# Patient Record
Sex: MALE | Race: WHITE | Employment: FULL TIME | URBAN - METROPOLITAN AREA
[De-identification: names, ages, dates, MRNs, and addresses within clinical notes are randomized per-mention and may not be internally consistent; named-entity substitution may affect disease eponyms.]

---

## 2019-01-22 ENCOUNTER — OFFICE VISIT (OUTPATIENT)
Dept: FAMILY MEDICINE CLINIC | Age: 49
End: 2019-01-22

## 2019-01-22 ENCOUNTER — HOSPITAL ENCOUNTER (OUTPATIENT)
Dept: LAB | Age: 49
Discharge: HOME | End: 2019-01-22
Payer: MEDICARE

## 2019-01-22 VITALS
HEART RATE: 78 BPM | BODY MASS INDEX: 34.06 KG/M2 | SYSTOLIC BLOOD PRESSURE: 108 MMHG | WEIGHT: 251.5 LBS | OXYGEN SATURATION: 96 % | RESPIRATION RATE: 18 BRPM | DIASTOLIC BLOOD PRESSURE: 89 MMHG | TEMPERATURE: 97.2 F | HEIGHT: 72 IN

## 2019-01-22 DIAGNOSIS — M48.02 STENOSIS OF CERVICAL SPINE WITH MYELOPATHY (HCC): Primary | Chronic | ICD-10-CM

## 2019-01-22 DIAGNOSIS — G99.2 STENOSIS OF CERVICAL SPINE WITH MYELOPATHY (HCC): Primary | Chronic | ICD-10-CM

## 2019-01-22 DIAGNOSIS — K21.9 GASTROESOPHAGEAL REFLUX DISEASE WITHOUT ESOPHAGITIS: ICD-10-CM

## 2019-01-22 DIAGNOSIS — E66.1 CLASS 1 DRUG-INDUCED OBESITY WITH BODY MASS INDEX (BMI) OF 34.0 TO 34.9 IN ADULT, UNSPECIFIED WHETHER SERIOUS COMORBIDITY PRESENT: ICD-10-CM

## 2019-01-22 DIAGNOSIS — R60.0 EDEMA OF RIGHT LOWER EXTREMITY: ICD-10-CM

## 2019-01-22 PROCEDURE — 84443 ASSAY THYROID STIM HORMONE: CPT

## 2019-01-22 PROCEDURE — 83718 ASSAY OF LIPOPROTEIN: CPT

## 2019-01-22 PROCEDURE — 82465 ASSAY BLD/SERUM CHOLESTEROL: CPT

## 2019-01-22 PROCEDURE — 36415 COLL VENOUS BLD VENIPUNCTURE: CPT

## 2019-01-22 PROCEDURE — 85027 COMPLETE CBC AUTOMATED: CPT

## 2019-01-22 PROCEDURE — 80053 COMPREHEN METABOLIC PANEL: CPT

## 2019-01-22 RX ORDER — PREGABALIN 300 MG/1
300 CAPSULE ORAL 2 TIMES DAILY
COMMUNITY

## 2019-01-22 RX ORDER — BACLOFEN 10 MG/1
TABLET ORAL
COMMUNITY
End: 2020-04-10 | Stop reason: SDUPTHER

## 2019-01-22 RX ORDER — PHENTERMINE HYDROCHLORIDE 37.5 MG/1
37.5 TABLET ORAL
Qty: 30 TAB | Refills: 0 | Status: SHIPPED | OUTPATIENT
Start: 2019-01-22 | End: 2019-08-22 | Stop reason: ALTCHOICE

## 2019-01-22 RX ORDER — TAMSULOSIN HYDROCHLORIDE 0.4 MG/1
0.4 CAPSULE ORAL DAILY
COMMUNITY

## 2019-01-22 RX ORDER — DULOXETIN HYDROCHLORIDE 30 MG/1
30 CAPSULE, DELAYED RELEASE ORAL 2 TIMES DAILY
COMMUNITY

## 2019-01-22 RX ORDER — PANTOPRAZOLE SODIUM 40 MG/1
40 TABLET, DELAYED RELEASE ORAL DAILY
Qty: 40 TAB | Refills: 2 | Status: SHIPPED | OUTPATIENT
Start: 2019-01-22

## 2019-01-22 NOTE — PATIENT INSTRUCTIONS
Learning About Benefits From Quitting Smoking How does quitting smoking make you healthier? If you're thinking about quitting smoking, you may have a few reasons to be smoke-free. Your health may be one of them. · When you quit smoking, you lower your risks for cancer, lung disease, heart attack, stroke, blood vessel disease, and blindness from macular degeneration. · When you're smoke-free, you get sick less often, and you heal faster. You are less likely to get colds, flu, bronchitis, and pneumonia. · As a nonsmoker, you may find that your mood is better and you are less stressed. When and how will you feel healthier? Quitting has real health benefits that start from day 1 of being smoke-free. And the longer you stay smoke-free, the healthier you get and the better you feel. The first hours · After just 20 minutes, your blood pressure and heart rate go down. That means there's less stress on your heart and blood vessels. · Within 12 hours, the level of carbon monoxide in your blood drops back to normal. That makes room for more oxygen. With more oxygen in your body, you may notice that you have more energy than when you smoked. After 2 weeks · Your lungs start to work better. · Your risk of heart attack starts to drop. After 1 month · When your lungs are clear, you cough less and breathe deeper, so it's easier to be active. · Your sense of taste and smell return. That means you can enjoy food more than you have since you started smoking. Over the years · After 1 year, your risk of heart disease is half what it would be if you kept smoking. · After 5 years, your risk of stroke starts to shrink. Within a few years after that, it's about the same as if you'd never smoked. · After 10 years, your risk of dying from lung cancer is cut by about half. And your risk for many other types of cancer is lower too. How would quitting help others in your life? When you quit smoking, you improve the health of everyone who now breathes in your smoke. · Their heart, lung, and cancer risks drop, much like yours. · They are sick less. For babies and small children, living smoke-free means they're less likely to have ear infections, pneumonia, and bronchitis. · If you're a woman who is or will be pregnant someday, quitting smoking means a healthier . · Children who are close to you are less likely to become adult smokers. Where can you learn more? Go to http://monica-zachery.info/. Enter [id number] [id number] [id number] in the search box to learn more about \"Learning About Benefits From Quitting Smoking. \" Current as of: 2018 Content Version: 11.9 © 0896-8609 [employer]. Care instructions adapted under license by [employer] (which disclaims liability or warranty for this information). If you have questions about a medical condition or this instruction, always ask your healthcare professional. [place] [address] any warranty or liability for your use of this information. Leg and Ankle Edema: Care Instructions Your Care Instructions Swelling in the legs, ankles, and feet is called edema. It is common after you sit or stand for a while. Long plane flights or car rides often cause swelling in the legs and feet. You may also have swelling if you have to stand for long periods of time at your job. Problems with the veins in the legs (varicose veins) and changes in hormones can also cause swelling. Sometimes the swelling in the ankles and feet is caused by a more serious problem, such as heart failure, infection, blood clots, or liver or kidney disease. Follow-up care is a key part of your treatment and safety. Be sure to make and go to all appointments, and call your doctor if you are having problems. It's also a good idea to know your test results and keep a list of the medicines you take. How can you care for yourself at home? · If your doctor gave you medicine, take it as prescribed. Call your doctor if you think you are having a problem with your medicine. · Whenever you are resting, raise your legs up. Try to keep the swollen area higher than the level of your heart. · Take breaks from standing or sitting in one position. ? Walk around to increase the blood flow in your lower legs. ? Move your feet and ankles often while you stand, or tighten and relax your leg muscles. · Wear support stockings. Put them on in the morning, before swelling gets worse. · Eat a balanced diet. Lose weight if you need to. · Limit the amount of salt (sodium) in your diet. Salt holds fluid in the body and may increase swelling. When should you call for help? Call 911 anytime you think you may need emergency care. For example, call if: 
  · You have symptoms of a blood clot in your lung (called a pulmonary embolism). These may include: 
? Sudden chest pain. ? Trouble breathing. ? Coughing up blood.  
 Call your doctor now or seek immediate medical care if: 
  · You have signs of a blood clot, such as: 
? Pain in your calf, back of the knee, thigh, or groin. ? Redness and swelling in your leg or groin.  
  · You have symptoms of infection, such as: 
? Increased pain, swelling, warmth, or redness. ? Red streaks or pus. ? A fever.  
 Watch closely for changes in your health, and be sure to contact your doctor if: 
  · Your swelling is getting worse.  
  · You have new or worsening pain in your legs.  
  · You do not get better as expected. Where can you learn more? Go to http://monica-zachery.info/. Enter U168 in the search box to learn more about \"Leg and Ankle Edema: Care Instructions. \" Current as of: September 23, 2018 Content Version: 11.9 © 0694-7447 [employer].  Care instructions adapted under license by [employer] (which disclaims liability or warranty for this information). If you have questions about a medical condition or this instruction, always ask your healthcare professional. [place] [address] any warranty or liability for your use of this information.

## 2019-01-22 NOTE — PROGRESS NOTES
Name and  verified Chief Complaint Patient presents with [address] [place] [address] New Patient Patient stated has not had a PCP in years.

## 2019-01-22 NOTE — PROGRESS NOTES
HISTORY OF PRESENT ILLNESS Lidia Krueger is a 50 y.o. male. 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 
 
Obesity Specific concerns today for my pt is the wt concerning, the patient states that no self induced vomiting, and no binge eating,  has been thinking about the use of any laxative use for a better wt, pt also states that different available diets has been tried, Patient present with b/lSwelling of the lower ext, Patient complains of edema. The location of the edema is lower leg, ankle(s)   feet one sided since his surgical treatment,   The edema has been moderate. Onset of symptoms was a few months ago, gradually worsening since that time. The edema is present all day,   the wt went up by close to 10 pounds,  
 
 
Current Outpatient Medications Medication Sig Dispense Refill  DULoxetine (CYMBALTA) 30 mg capsule Take 30 mg by mouth two (2) times a day.  pregabalin (LYRICA) 300 mg capsule Take 300 mg by mouth two (2) times a day.  tamsulosin (FLOMAX) 0.4 mg capsule Take 0.4 mg by mouth daily.  baclofen (LIORESAL) 10 mg tablet Take  by mouth three (3) times daily.  oxyCODONE IR (ROXICODONE) 5 mg immediate release tablet Take 1-2 Tabs by mouth every three (3) hours as needed.  Max Daily Amount: 80 mg. 70 Tab 0  
  ondansetron (ZOFRAN ODT) 8 mg disintegrating tablet Take 1 Tab by mouth every eight (8) hours as needed for Nausea. 30 Tab 0  cyclobenzaprine (FLEXERIL) 5 mg tablet Take 5 mg by mouth. Allergies Allergen Reactions  Penicillin G Other (comments) \"Swelling\" at age 3yo. Past Medical History:  
Diagnosis Date  Arthritis  Chronic pain  GERD (gastroesophageal reflux disease)  Stenosis of cervical spine with myelopathy 3/24/2016 Past Surgical History:  
Procedure Laterality Date  HX ORTHOPAEDIC Spinal Fusion  HX TONSILLECTOMY Family History Problem Relation Age of Onset  Heart defect Mother IRREGULAR HEARTBEAT  
 Other Brother PERIPHERAL ARTERY DISEASE  Anesth Problems Neg Hx Social History Tobacco Use  Smoking status: Current Every Day Smoker Packs/day: 1.00 Years: 20.00 Pack years: 20.00  Smokeless tobacco: Never Used Substance Use Topics  Alcohol use: Yes Comment: MONTHLY Lab Results Component Value Date/Time WBC 9.3 03/16/2016 09:33 AM  
 HGB 15.3 03/16/2016 09:33 AM  
 HCT 43.1 03/16/2016 09:33 AM  
 PLATELET 595 04/57/5517 09:33 AM  
 MCV 89.0 03/16/2016 09:33 AM  
 
Lab Results Component Value Date/Time GFR est non-AA >60 03/16/2016 09:33 AM  
 GFR est AA >60 03/16/2016 09:33 AM  
 Creatinine 0.95 03/16/2016 09:33 AM  
 BUN 12 03/16/2016 09:33 AM  
 Sodium 135 (L) 03/16/2016 09:33 AM  
 Potassium 4.0 03/16/2016 09:33 AM  
 Chloride 103 03/16/2016 09:33 AM  
 CO2 25 03/16/2016 09:33 AM  
  
Review of Systems Constitutional: Negative for chills and fever. HENT: Negative for ear pain and nosebleeds. Eyes: Negative for blurred vision, pain and discharge. Respiratory: Negative for shortness of breath. Cardiovascular: Positive for leg swelling. Negative for chest pain. Gastrointestinal: Negative for constipation, diarrhea, nausea and vomiting. Genitourinary: Negative for frequency. Musculoskeletal: Positive for back pain and joint pain. Skin: Negative for itching and rash. Neurological: Negative for headaches. Psychiatric/Behavioral: Negative for depression. The patient is not nervous/anxious. Physical Exam  
Constitutional: He is oriented to person, place, and time. He appears well-developed and well-nourished. HENT:  
Head: Normocephalic and atraumatic. Mouth/Throat: No oropharyngeal exudate. Eyes: Conjunctivae and EOM are normal.  
Neck: Normal range of motion. Neck supple. Cardiovascular: Normal rate, regular rhythm and normal heart sounds. No murmur heard. Pulmonary/Chest: Effort normal and breath sounds normal. No respiratory distress. Abdominal: Soft. Bowel sounds are normal. He exhibits no distension. There is no rebound. Musculoskeletal: He exhibits edema, tenderness and deformity. Neurological: He is alert and oriented to person, place, and time. Skin: Skin is warm. No erythema. Psychiatric: He has a normal mood and affect. His behavior is normal.  
Nursing note and vitals reviewed. ASSESSMENT and PLAN Diagnoses and all orders for this visit: 1. Stenosis of cervical spine with myelopathy 
-     CBC W/O DIFF 
-     METABOLIC PANEL, COMPREHENSIVE 
-     TSH 3RD GENERATION 
-     HDL CHOLESTEROL 
-     CHOLESTEROL, TOTAL 
-     AMB SUPPLY ORDER 
 
2. Edema of right lower extremity 
-     CBC W/O DIFF 
-     METABOLIC PANEL, COMPREHENSIVE 
-     TSH 3RD GENERATION 
-     HDL CHOLESTEROL 
-     CHOLESTEROL, TOTAL 
-     phentermine (ADIPEX-P) 37.5 mg tablet; Take 1 Tab by mouth every morning. Max Daily Amount: 37.5 mg. 
-     AMB SUPPLY ORDER 
 
3. Class 1 drug-induced obesity with body mass index (BMI) of 34.0 to 34.9 in adult, unspecified whether serious comorbidity present  
-     HDL CHOLESTEROL 
-     phentermine (ADIPEX-P) 37.5 mg tablet; Take 1 Tab by mouth every morning.  Max Daily Amount: 37.5 mg. 
 4. Gastroesophageal reflux disease without esophagitis -     pantoprazole (PROTONIX) 40 mg tablet; Take 1 Tab by mouth daily.

## 2019-01-23 LAB
ALBUMIN SERPL-MCNC: 4.1 G/DL (ref 3.5–5.5)
ALBUMIN/GLOB SERPL: 1.6 {RATIO} (ref 1.2–2.2)
ALP SERPL-CCNC: 90 IU/L (ref 39–117)
ALT SERPL-CCNC: 16 IU/L (ref 0–44)
AST SERPL-CCNC: 14 IU/L (ref 0–40)
BILIRUB SERPL-MCNC: 0.3 MG/DL (ref 0–1.2)
BUN SERPL-MCNC: 9 MG/DL (ref 6–24)
BUN/CREAT SERPL: 9 (ref 9–20)
CALCIUM SERPL-MCNC: 9.3 MG/DL (ref 8.7–10.2)
CHLORIDE SERPL-SCNC: 102 MMOL/L (ref 96–106)
CHOLEST SERPL-MCNC: 179 MG/DL (ref 100–199)
CO2 SERPL-SCNC: 23 MMOL/L (ref 20–29)
CREAT SERPL-MCNC: 0.99 MG/DL (ref 0.76–1.27)
ERYTHROCYTE [DISTWIDTH] IN BLOOD BY AUTOMATED COUNT: 13.2 % (ref 12.3–15.4)
GLOBULIN SER CALC-MCNC: 2.5 G/DL (ref 1.5–4.5)
GLUCOSE SERPL-MCNC: 98 MG/DL (ref 65–99)
HCT VFR BLD AUTO: 42.8 % (ref 37.5–51)
HDLC SERPL-MCNC: 30 MG/DL
HGB BLD-MCNC: 14.6 G/DL (ref 13–17.7)
MCH RBC QN AUTO: 30.8 PG (ref 26.6–33)
MCHC RBC AUTO-ENTMCNC: 34.1 G/DL (ref 31.5–35.7)
MCV RBC AUTO: 90 FL (ref 79–97)
PLATELET # BLD AUTO: 286 X10E3/UL (ref 150–379)
POTASSIUM SERPL-SCNC: 4.3 MMOL/L (ref 3.5–5.2)
PROT SERPL-MCNC: 6.6 G/DL (ref 6–8.5)
RBC # BLD AUTO: 4.74 X10E6/UL (ref 4.14–5.8)
SODIUM SERPL-SCNC: 139 MMOL/L (ref 134–144)
TSH SERPL DL<=0.005 MIU/L-ACNC: 4.56 UIU/ML (ref 0.45–4.5)
WBC # BLD AUTO: 8.4 X10E3/UL (ref 3.4–10.8)

## 2019-02-05 ENCOUNTER — OFFICE VISIT (OUTPATIENT)
Dept: FAMILY MEDICINE CLINIC | Age: 49
End: 2019-02-05

## 2019-02-05 VITALS
HEART RATE: 98 BPM | DIASTOLIC BLOOD PRESSURE: 71 MMHG | RESPIRATION RATE: 18 BRPM | BODY MASS INDEX: 32.37 KG/M2 | OXYGEN SATURATION: 95 % | WEIGHT: 239 LBS | HEIGHT: 72 IN | SYSTOLIC BLOOD PRESSURE: 105 MMHG | TEMPERATURE: 97.1 F

## 2019-02-05 DIAGNOSIS — G35 MULTIPLE SCLEROSIS (HCC): ICD-10-CM

## 2019-02-05 DIAGNOSIS — G95.9 SPINAL CORD LESION (HCC): ICD-10-CM

## 2019-02-05 DIAGNOSIS — Z13.31 SCREENING FOR DEPRESSION: ICD-10-CM

## 2019-02-05 DIAGNOSIS — Z00.00 MEDICARE ANNUAL WELLNESS VISIT, INITIAL: Primary | ICD-10-CM

## 2019-02-05 DIAGNOSIS — Z13.39 SCREENING FOR ALCOHOLISM: ICD-10-CM

## 2019-02-05 DIAGNOSIS — Z23 ENCOUNTER FOR IMMUNIZATION: ICD-10-CM

## 2019-02-05 DIAGNOSIS — M48.02 STENOSIS OF CERVICAL SPINE WITH MYELOPATHY (HCC): Chronic | ICD-10-CM

## 2019-02-05 DIAGNOSIS — G99.2 STENOSIS OF CERVICAL SPINE WITH MYELOPATHY (HCC): Chronic | ICD-10-CM

## 2019-02-05 RX ORDER — ASPIRIN 81 MG/1
81 TABLET ORAL DAILY
Qty: 30 TAB | Refills: 11
Start: 2019-02-05

## 2019-02-05 RX ORDER — PHENTERMINE HYDROCHLORIDE 37.5 MG/1
37.5 TABLET ORAL
Qty: 30 TAB | Refills: 0 | Status: SHIPPED | OUTPATIENT
Start: 2019-04-21 | End: 2019-08-22 | Stop reason: ALTCHOICE

## 2019-02-05 RX ORDER — PHENTERMINE HYDROCHLORIDE 37.5 MG/1
37.5 TABLET ORAL
Qty: 30 TAB | Refills: 0 | Status: SHIPPED | OUTPATIENT
Start: 2019-03-21 | End: 2019-08-22 | Stop reason: ALTCHOICE

## 2019-02-05 RX ORDER — ROSUVASTATIN CALCIUM 10 MG/1
10 TABLET, COATED ORAL
Qty: 30 TAB | Refills: 6 | Status: SHIPPED | OUTPATIENT
Start: 2019-02-05

## 2019-02-05 RX ORDER — PHENTERMINE HYDROCHLORIDE 37.5 MG/1
37.5 TABLET ORAL
Qty: 30 TAB | Refills: 0 | Status: SHIPPED | OUTPATIENT
Start: 2019-02-21 | End: 2019-08-22 | Stop reason: ALTCHOICE

## 2019-02-05 NOTE — PATIENT INSTRUCTIONS
Well Visit, Ages 25 to 48: Care Instructions Your Care Instructions Physical exams can help you stay healthy. Your doctor has checked your overall health and may have suggested ways to take good care of yourself. He or she also may have recommended tests. At home, you can help prevent illness with healthy eating, regular exercise, and other steps. Follow-up care is a key part of your treatment and safety. Be sure to make and go to all appointments, and call your doctor if you are having problems. It's also a good idea to know your test results and keep a list of the medicines you take. How can you care for yourself at home? · Reach and stay at a healthy weight. This will lower your risk for many problems, such as obesity, diabetes, heart disease, and high blood pressure. · Get at least 30 minutes of physical activity on most days of the week. Walking is a good choice. You also may want to do other activities, such as running, swimming, cycling, or playing tennis or team sports. Discuss any changes in your exercise program with your doctor. · Do not smoke or allow others to smoke around you. If you need help quitting, talk to your doctor about stop-smoking programs and medicines. These can increase your chances of quitting for good. · Talk to your doctor about whether you have any risk factors for sexually transmitted infections (STIs). Having one sex partner (who does not have STIs and does not have sex with anyone else) is a good way to avoid these infections. · Use birth control if you do not want to have children at this time. Talk with your doctor about the choices available and what might be best for you. · Protect your skin from too much sun. When you're outdoors from 10 a.m. to 4 p.m., stay in the shade or cover up with clothing and a hat with a wide brim. Wear sunglasses that block UV rays. Even when it's cloudy, put broad-spectrum sunscreen (SPF 30 or higher) on any exposed skin. · See a dentist one or two times a year for checkups and to have your teeth cleaned. · Wear a seat belt in the car. · Drink alcohol in moderation, if at all. That means no more than 2 drinks a day for men and 1 drink a day for women. Follow your doctor's advice about when to have certain tests. These tests can spot problems early. For everyone · Cholesterol. Have the fat (cholesterol) in your blood tested after age 21. Your doctor will tell you how often to have this done based on your age, family history, or other things that can increase your risk for heart disease. · Blood pressure. Have your blood pressure checked during a routine doctor visit. Your doctor will tell you how often to check your blood pressure based on your age, your blood pressure results, and other factors. · Vision. Talk with your doctor about how often to have a glaucoma test. 
· Diabetes. Ask your doctor whether you should have tests for diabetes. · Colon cancer. Have a test for colon cancer at age 48. You may have one of several tests. If you are younger than 48, you may need a test earlier if you have any risk factors. Risk factors include whether you already had a precancerous polyp removed from your colon or whether your parent, brother, sister, or child has had colon cancer. For women · Breast exam and mammogram. Talk to your doctor about when you should have a clinical breast exam and a mammogram. Medical experts differ on whether and how often women under 50 should have these tests. Your doctor can help you decide what is right for you. · Pap test and pelvic exam. Begin Pap tests at age 24. A Pap test is the best way to find cervical cancer. The test often is part of a pelvic exam. Ask how often to have this test. 
· Tests for sexually transmitted infections (STIs). Ask whether you should have tests for STIs. You may be at risk if you have sex with more than one person, especially if your partners do not wear condoms. For men · Tests for sexually transmitted infections (STIs). Ask whether you should have tests for STIs. You may be at risk if you have sex with more than one person, especially if you do not wear a condom. · Testicular cancer exam. Ask your doctor whether you should check your testicles regularly. · Prostate exam. Talk to your doctor about whether you should have a blood test (called a PSA test) for prostate cancer. Experts differ on whether and when men should have this test. Some experts suggest it if you are older than 39 and are -American or have a father or brother who got prostate cancer when he was younger than 72. When should you call for help? Watch closely for changes in your health, and be sure to contact your doctor if you have any problems or symptoms that concern you. Where can you learn more? Go to http://monica-zachery.info/. Enter P072 in the search box to learn more about \"Well Visit, Ages 25 to 48: Care Instructions. \" Current as of: March 28, 2018 Content Version: 11.9 © 6915-9430 [employer]. Care instructions adapted under license by [employer] (which disclaims liability or warranty for this information). If you have questions about a medical condition or this instruction, always ask your healthcare professional. [address] any warranty or liability for your use of this information. Body Mass Index: Care Instructions Your Care Instructions Body mass index (BMI) can help you see if your weight is raising your risk for health problems. It uses a formula to compare how much you weigh with how tall you are. · A BMI lower than 18.5 is considered underweight. · A BMI between 18.5 and 24.9 is considered healthy. · A BMI between 25 and 29.9 is considered overweight. A BMI of 30 or higher is considered obese.  
If your BMI is in the normal range, it means that you have a lower risk for weight-related health problems. If your BMI is in the overweight or obese range, you may be at increased risk for weight-related health problems, such as high blood pressure, heart disease, stroke, arthritis or joint pain, and diabetes. If your BMI is in the underweight range, you may be at increased risk for health problems such as fatigue, lower protection (immunity) against illness, muscle loss, bone loss, hair loss, and hormone problems. BMI is just one measure of your risk for weight-related health problems. You may be at higher risk for health problems if you are not active, you eat an unhealthy diet, or you drink too much alcohol or use tobacco products. Follow-up care is a key part of your treatment and safety. Be sure to make and go to all appointments, and call your doctor if you are having problems. It's also a good idea to know your test results and keep a list of the medicines you take. How can you care for yourself at home? · Practice healthy eating habits. This includes eating plenty of fruits, vegetables, whole grains, lean protein, and low-fat dairy. · If your doctor recommends it, get more exercise. Walking is a good choice. Bit by bit, increase the amount you walk every day. Try for at least 30 minutes on most days of the week. · Do not smoke. Smoking can increase your risk for health problems. If you need help quitting, talk to your doctor about stop-smoking programs and medicines. These can increase your chances of quitting for good. · Limit alcohol to 2 drinks a day for men and 1 drink a day for women. Too much alcohol can cause health problems. If you have a BMI higher than 25 · Your doctor may do other tests to check your risk for weight-related health problems. This may include measuring the distance around your waist. A waist measurement of more than 40 inches in men or 35 inches in women can increase the risk of weight-related health problems. · Talk with your doctor about steps you can take to stay healthy or improve your health. You may need to make lifestyle changes to lose weight and stay healthy, such as changing your diet and getting regular exercise. If you have a BMI lower than 18.5 · Your doctor may do other tests to check your risk for health problems. · Talk with your doctor about steps you can take to stay healthy or improve your health. You may need to make lifestyle changes to gain or maintain weight and stay healthy, such as getting more healthy foods in your diet and doing exercises to build muscle. Where can you learn more? Go to http://monica-zachery.info/. Enter S176 in the search box to learn more about \"Body Mass Index: Care Instructions. \" Current as of: October 13, 2016 Content Version: 11.4 © 2253-4255 [employer]. Care instructions adapted under license by "[employer] (which disclaims liability or warranty for this information). If you have questions about a medical condition or this instruction, always ask your healthcare professional. [place] [address] any warranty or liability for your use of this information. Medicare Wellness Visit, Male The best way to live healthy is to have a lifestyle where you eat a well-balanced diet, exercise regularly, limit alcohol use, and quit all forms of tobacco/nicotine, if applicable. Regular preventive services are another way to keep healthy. Preventive services (vaccines, screening tests, monitoring & exams) can help personalize your care plan, which helps you manage your own care. Screening tests can find health problems at the earliest stages, when they are easiest to treat. [facility] follows the current, evidence-based guidelines published by the [facility] and [place] [employer] (USPSTF) when recommending preventive services for our patients.  Because we follow these guidelines, sometimes recommendations change over time as research supports it. (For example, a prostate screening blood test is no longer routinely recommended for men with no symptoms.) Of course, you and your doctor may decide to screen more often for some diseases, based on your risk and co-morbidities (chronic disease you are already diagnosed with). Preventive services for you include: - Medicare offers their members a free annual wellness visit, which is time for you and your primary care provider to discuss and plan for your preventive service needs. Take advantage of this benefit every year! 
-All adults over age 72 should receive the recommended pneumonia vaccines. Current USPSTF guidelines recommend a series of two vaccines for the best pneumonia protection.  
-All adults should have a flu vaccine yearly and an ECG. All adults age 61 and older should receive a shingles vaccine once in their lifetime.   
-All adults age 38-68 who are overweight should have a diabetes screening test once every three years.  
-Other screening tests & preventive services for persons with diabetes include: an eye exam to screen for diabetic retinopathy, a kidney function test, a foot exam, and stricter control over your cholesterol.  
-Cardiovascular screening for adults with routine risk involves an electrocardiogram (ECG) at intervals determined by the provider.  
-Colorectal cancer screening should be done for adults age 54-65 with no increased risk factors for colorectal cancer. There are a number of acceptable methods of screening for this type of cancer. Each test has its own benefits and drawbacks. Discuss with your provider what is most appropriate for you during your annual wellness visit.  The different tests include: colonoscopy (considered the best screening method), a fecal occult blood test, a fecal DNA test, and sigmoidoscopy. 
-All adults born between [facility] should be screened once for Hepatitis C. 
-An Abdominal Aortic Aneurysm (AAA) Screening is recommended for men age 73-68 who has ever smoked in their lifetime. Here is a list of your current Health Maintenance items (your personalized list of preventive services) with a due date: 
Health Maintenance Due Topic Date Due  Pneumococcal Vaccine (1 of 1 - PPSV23) 10/30/1989  
 DTaP/Tdap/Td  (1 - Tdap) 10/30/1991 [address] [place] [address] Annual Well Visit  12/27/2018 Colon Cancer Screening: Care Instructions Your Care Instructions Colorectal cancer occurs in the colon or rectum. That's the lower part of your digestive system. It is the second-leading cause of cancer deaths in the [place]. It often starts with small growths called polyps in the colon or rectum. Polyps are usually found with screening tests. Depending on the type of test, any polyps found may be removed during the tests. Colorectal cancer usually does not cause symptoms at first. But regular tests can help find it early, before it spreads and becomes harder to treat. Experts advise routine tests for colon cancer for people starting at age 48. And they advise people with a higher risk of colon cancer to get tested sooner. Talk with your doctor about when you should start testing. Discuss which tests you need. Follow-up care is a key part of your treatment and safety. Be sure to make and go to all appointments, and call your doctor if you are having problems. It's also a good idea to know your test results and keep a list of the medicines you take. What are the main screening tests for colon cancer? · Stool tests. These include the fecal immunochemical test (FIT) and the fecal occult blood test (FOBT). These tests check stool samples for signs of cancer. If your test is positive, you will need to have a colonoscopy. · Sigmoidoscopy. This test lets your doctor look at the lining of your rectum and the lowest part of your colon.  Your doctor uses a lighted tube called a sigmoidoscope. This test can't find cancers or polyps in the upper part of your colon. In some cases, polyps that are found can be removed. But if your doctor finds polyps, you will need to have a colonoscopy to check the upper part of your colon. · Colonoscopy. This test lets your doctor look at the lining of your rectum and your entire colon. The doctor uses a thin, flexible tool called a colonoscope. It can also be used to remove polyps or get a tissue sample (biopsy). What tests do you need? The following guidelines are for people age 48 and over who are not at high risk for colorectal cancer. You may have at least one of these tests as directed by your doctor. · Fecal immunochemical test (FIT) or fecal occult blood test (FOBT) every year · Sigmoidoscopy every 5 years · Colonoscopy every 10 years If you are age 68 to 80, you can work with your doctor to decide if screening is a good option. If you are age 80 or older, your doctor will likely advise that screening is not helpful. Talk with your doctor about when you need to be tested. And discuss which tests are right for you. Your doctor may recommend earlier or more frequent testing if you: 
· Have had colorectal cancer before. · Have had colon polyps. · Have symptoms of colorectal cancer. These include blood in your stool and changes in your bowel habits. · Have a parent, brother or sister, or child with colon polyps or colorectal cancer. · Have a bowel disease. This includes ulcerative colitis and Crohn's disease. · Have a rare polyp syndrome that runs in families, such as familial adenomatous polyposis (FAP). · Have had radiation treatments to the belly or pelvis. When should you call for help? Watch closely for changes in your health, and be sure to contact your doctor if: 
  · You have any changes in your bowel habits.  
  · You have any problems. Where can you learn more? Go to http://monica-zachery.info/. Enter M541 in the search box to learn more about \"Colon Cancer Screening: Care Instructions. \" Current as of: March 28, 2018 Content Version: 11.8 © 9422-0046 [employer]. Care instructions adapted under license by [employer] (which disclaims liability or warranty for this information). If you have questions about a medical condition or this instruction, always ask your healthcare professional. [address] any warranty or liability for your use of this information. Preventing Falls: Care Instructions Your Care Instructions Getting around your home safely can be a challenge if you have injuries or health problems that make it easy for you to fall. Loose rugs and furniture in walkways are among the dangers for many older people who have problems walking or who have poor eyesight. People who have conditions such as arthritis, osteoporosis, or dementia also have to be careful not to fall. You can make your home safer with a few simple measures. Follow-up care is a key part of your treatment and safety. Be sure to make and go to all appointments, and call your doctor if you are having problems. It's also a good idea to know your test results and keep a list of the medicines you take. How can you care for yourself at home? Taking care of yourself · You may get dizzy if you do not drink enough water. To prevent dehydration, drink plenty of fluids, enough so that your urine is light yellow or clear like water. Choose water and other caffeine-free clear liquids. If you have kidney, heart, or liver disease and have to limit fluids, talk with your doctor before you increase the amount of fluids you drink. · Exercise regularly to improve your strength, muscle tone, and balance. Walk if you can. Swimming may be a good choice if you cannot walk easily.  
· Have your vision and hearing checked each year or any time you notice a change. If you have trouble seeing and hearing, you might not be able to avoid objects and could lose your balance. · Know the side effects of the medicines you take. Ask your doctor or pharmacist whether the medicines you take can affect your balance. Sleeping pills or sedatives can affect your balance. · Limit the amount of alcohol you drink. Alcohol can impair your balance and other senses. · Ask your doctor whether calluses or corns on your feet need to be removed. If you wear loose-fitting shoes because of calluses or corns, you can lose your balance and fall. · Talk to your doctor if you have numbness in your feet. Preventing falls at home · Remove raised doorway thresholds, throw rugs, and clutter. Repair loose carpet or raised areas in the floor. · Move furniture and electrical cords to keep them out of walking paths. · Use nonskid floor wax, and wipe up spills right away, especially on ceramic tile floors. · If you use a walker or cane, put rubber tips on it. If you use crutches, clean the bottoms of them regularly with an abrasive pad, such as steel wool. · Keep your house well lit, especially Miguel [place], and outside walkways. Use night-lights in areas such as hallways and bathrooms. Add extra light switches or use remote switches (such as switches that go on or off when you clap your hands) to make it easier to turn lights on if you have to get up during the night. · Install sturdy handrails on stairways. · Move items in your cabinets so that the things you use a lot are on the lower shelves (about waist level). · Keep a cordless phone and a flashlight with new batteries by your bed. If possible, put a phone in each of the main rooms of your house, or carry a cell phone in case you fall and cannot reach a phone. Or, you can wear a device around your neck or wrist. You push a button that sends a signal for help. · Wear low-heeled shoes that fit well and give your feet good support. Use footwear with nonskid soles. Check the heels and soles of your shoes for wear. Repair or replace worn heels or soles. · Do not wear socks without shoes on wood floors. · Walk on the grass when the sidewalks are slippery. If you live in an area that gets snow and ice in the winter, sprinkle salt on slippery steps and sidewalks. Preventing falls in the bath · Install grab bars and nonskid mats inside and outside your shower or tub and near the toilet and sinks. · Use shower chairs and bath benches. · Use a hand-held shower head that will allow you to sit while showering. · Get into a tub or shower by putting the weaker leg in first. Get out of a tub or shower with your strong side first. 
· Repair loose toilet seats and consider installing a raised toilet seat to make getting on and off the toilet easier. · Keep your bathroom door unlocked while you are in the shower. Where can you learn more? Go to http://monica-zachery.info/. Enter [id number] in the search box to learn more about \"Preventing Falls: Care Instructions. \" Current as of: March 16, 2018 Content Version: 11.8 © 7961-1413 [employer]. Care instructions adapted under license by [employer] (which disclaims liability or warranty for this information). If you have questions about a medical condition or this instruction, always ask your healthcare professional. [place] [address] any warranty or liability for your use of this information.

## 2019-05-21 ENCOUNTER — OFFICE VISIT (OUTPATIENT)
Dept: FAMILY MEDICINE CLINIC | Age: 49
End: 2019-05-21

## 2019-05-21 VITALS
HEART RATE: 78 BPM | BODY MASS INDEX: 29.23 KG/M2 | HEIGHT: 72 IN | TEMPERATURE: 96.7 F | SYSTOLIC BLOOD PRESSURE: 112 MMHG | WEIGHT: 215.8 LBS | DIASTOLIC BLOOD PRESSURE: 74 MMHG | RESPIRATION RATE: 20 BRPM | OXYGEN SATURATION: 94 %

## 2019-05-21 DIAGNOSIS — J20.8 ACUTE BRONCHITIS DUE TO OTHER SPECIFIED ORGANISMS: Primary | ICD-10-CM

## 2019-05-21 DIAGNOSIS — G99.2 STENOSIS OF CERVICAL SPINE WITH MYELOPATHY (HCC): ICD-10-CM

## 2019-05-21 DIAGNOSIS — L23.7 ALLERGIC DERMATITIS DUE TO POISON IVY: ICD-10-CM

## 2019-05-21 DIAGNOSIS — M48.02 STENOSIS OF CERVICAL SPINE WITH MYELOPATHY (HCC): ICD-10-CM

## 2019-05-21 RX ORDER — AZITHROMYCIN 250 MG/1
TABLET, FILM COATED ORAL
Qty: 6 TAB | Refills: 0 | Status: SHIPPED | OUTPATIENT
Start: 2019-05-21 | End: 2020-01-06 | Stop reason: ALTCHOICE

## 2019-05-21 RX ORDER — BETAMETHASONE DIPROPIONATE 0.5 MG/G
CREAM TOPICAL 2 TIMES DAILY
Qty: 45 G | Refills: 0 | Status: SHIPPED | OUTPATIENT
Start: 2019-05-21 | End: 2019-07-31 | Stop reason: SDUPTHER

## 2019-05-21 RX ORDER — GUAIFENESIN 600 MG/1
600 TABLET, EXTENDED RELEASE ORAL 2 TIMES DAILY
Qty: 15 TAB | Refills: 0
Start: 2019-05-21

## 2019-05-21 RX ORDER — METHYLPREDNISOLONE 4 MG/1
TABLET ORAL
Qty: 1 DOSE PACK | Refills: 0 | Status: SHIPPED | OUTPATIENT
Start: 2019-05-21 | End: 2019-07-31 | Stop reason: ALTCHOICE

## 2019-05-21 NOTE — PATIENT INSTRUCTIONS
Poison [place], and [place]: Care Instructions Your Care Instructions Poison ivy, poison oak, and poison sumac are plants that can cause a skin rash upon contact. The red, itchy rash often shows up in lines or streaks and may cause fluid-filled blisters or large, raised hives. The rash is caused by an allergic reaction to an oil in poison ivy, oak, and sumac. The rash may occur when you touch the plant or when you touch clothing, pet fur, sporting gear, gardening tools, or other objects that have come in contact with one of these plants. You cannot catch or spread the rash, even if you touch it or the blister fluid, because the plant oil will already have been absorbed or washed off the skin. The rash may seem to be spreading, but either it is still developing from earlier contact or you have touched something that still has the plant oil on it. Follow-up care is a key part of your treatment and safety. Be sure to make and go to all appointments, and call your doctor if you are having problems. It's also a good idea to know your test results and keep a list of the medicines you take. How can you care for yourself at home? · If your doctor prescribed a cream, use it as directed. If your doctor prescribed medicine, take it exactly as prescribed. Call your doctor if you think you are having a problem with your medicine. · Use cold, wet cloths to reduce itching. · Keep cool, and stay out of the sun. · Leave the rash open to the air. · Wash all clothing or other things that may have come in contact with the plant oil. · Avoid most lotions and ointments until the rash heals. Calamine lotion may help relieve symptoms of a plant rash. Use it 3 or 4 times a day. To prevent poison ivy exposure If you know that you will be near poison ivy, oak, or sumac, you can try these options: · Use a product designed to help prevent plant oil from getting on the skin. These products, such as Ivy X Pre-Contact Skin Solution, come in lotions, sprays, or towelettes. You put the product on your skin right before you go outdoors. · If you did not use a preventive product and you have had contact with plant oil, clean it off your skin as soon as possible. Use a product such as [employer] Original Outdoor Skin Cleanser. These products can also be used to clean plant oil from clothing or tools. When should you call for help? Call your doctor now or seek immediate medical care if: 
  · Your rash gets worse, and you start to feel bad and have a fever, a stiff neck, nausea, and vomiting.  
  · You have signs of infection, such as: 
? Increased pain, swelling, warmth, or redness. ? Red streaks leading from the rash. ? Pus draining from the rash. ? A fever.  
 Watch closely for changes in your health, and be sure to contact your doctor if: 
  · You have new blisters or bruises, or the rash spreads and looks like a sunburn.  
  · The rash gets worse, or it comes back after nearly disappearing.  
  · You think a medicine you are using is making your rash worse.  
  · Your rash does not clear up after 1 to 2 weeks of home treatment.  
  · You have joint aches or body aches with your rash. Where can you learn more? Go to http://monica-zachery.info/. Enter C210 in the search box to learn more about \"Poison [place], [place], and [place]: Care Instructions. \" Current as of: April 17, 2018 Content Version: 11.9 © 7147-0546 [employer]. Care instructions adapted under license by [employer] ([email or web address]) (which disclaims liability or warranty for this information). If you have questions about a medical condition or this instruction, always ask your healthcare professional. [address] any warranty or liability for your use of this information. Bronchitis: Care Instructions Your Care Instructions Bronchitis is inflammation of the bronchial tubes, which carry air to the lungs. The tubes swell and produce mucus, or phlegm. The mucus and inflamed bronchial tubes make you cough. You may have trouble breathing. Most cases of bronchitis are caused by viruses like those that cause colds. Antibiotics usually do not help and they may be harmful. Bronchitis usually develops rapidly and lasts about 2 to 3 weeks in otherwise healthy people. Follow-up care is a key part of your treatment and safety. Be sure to make and go to all appointments, and call your doctor if you are having problems. It's also a good idea to know your test results and keep a list of the medicines you take. How can you care for yourself at home? · Take all medicines exactly as prescribed. Call your doctor if you think you are having a problem with your medicine. · Get some extra rest. 
· Take an over-the-counter pain medicine, such as acetaminophen (Tylenol), ibuprofen (Advil, Motrin), or naproxen (Aleve) to reduce fever and relieve body aches. Read and follow all instructions on the label. · Do not take two or more pain medicines at the same time unless the doctor told you to. Many pain medicines have acetaminophen, which is Tylenol. Too much acetaminophen (Tylenol) can be harmful. · Take an over-the-counter cough medicine that contains dextromethorphan to help quiet a dry, hacking cough so that you can sleep. Avoid cough medicines that have more than one active ingredient. Read and follow all instructions on the label. · Breathe moist air from a humidifier, hot shower, or sink filled with hot water. The heat and moisture will thin mucus so you can cough it out. · Do not smoke. Smoking can make bronchitis worse. If you need help quitting, talk to your doctor about stop-smoking programs and medicines. These can increase your chances of quitting for good. When should you call for help? Call 911 anytime you think you may need emergency care. For example, call if: 
  · You have severe trouble breathing.  
 Call your doctor now or seek immediate medical care if: 
  · You have new or worse trouble breathing.  
  · You cough up dark brown or bloody mucus (sputum).  
  · You have a new or higher fever.  
  · You have a new rash.  
 Watch closely for changes in your health, and be sure to contact your doctor if: 
  · You cough more deeply or more often, especially if you notice more mucus or a change in the color of your mucus.  
  · You are not getting better as expected. Where can you learn more? Go to http://monica-zachery.info/. Enter H333 in the search box to learn more about \"Bronchitis: Care Instructions. \" Current as of: September 5, 2018 Content Version: 11.9 © 9016-7645 [employer]. Care instructions adapted under license by [employer] (which disclaims liability or warranty for this information). If you have questions about a medical condition or this instruction, always ask your healthcare professional. [address] any warranty or liability for your use of this information.

## 2019-05-21 NOTE — PROGRESS NOTES
HISTORY OF PRESENT ILLNESS  Edi Boucher is a 50 y.o. male. HPI   Rash of the neck, b/l hands arms elbow and upper back and some linear skin lesion on the back  Started few weeks ago not better tried alcohol washing and OTC antibiotic ointments, dosenot tingles and not pain full, states that is notexpanding red, and not  swelled up, with itchiness  Upper respiratory problem    Started >9 days ago not better,   otc not helping, have a very bad Sore throat, with a lot of painful Cough which are Productive yellowish , no hx of asthma but maybe of COPD, there has been a lot of decrease in the patient's sleep pattern, in addition there has been some muscle ache responding to OTC , no diarhea, no ear ache,also there has been a decrease in the appetite, has been had an exposure to sick person, fortunately an XXX smoker    Chronic upper neck with upper ext weaknesses   Currently on no opioid based meds, has been working out and lost some wt for which is bery happy about        Current Outpatient Medications   Medication Sig Dispense Refill    azithromycin (ZITHROMAX) 250 mg tablet 2 first day then one tab daily till finished 6 Tab 0    methylPREDNISolone (MEDROL DOSEPACK) 4 mg tablet As directed for 6 days one package 1 Dose Pack 0    betamethasone dipropionate (DIPROSONE) 0.05 % topical cream Apply  to affected area two (2) times a day. 45 g 0    guaiFENesin ER (MUCINEX) 600 mg ER tablet Take 1 Tab by mouth two (2) times a day. 15 Tab 0    pregabalin (LYRICA) 300 mg capsule Take 300 mg by mouth daily.  rosuvastatin (CRESTOR) 10 mg tablet Take 1 Tab by mouth nightly. 30 Tab 6    phentermine (ADIPEX-P) 37.5 mg tablet Take 1 Tab by mouth every morning. Max Daily Amount: 37.5 mg. 30 Tab 0    aspirin delayed-release 81 mg tablet Take 1 Tab by mouth daily. 30 Tab 11    phentermine (ADIPEX-P) 37.5 mg tablet Take 1 Tab by mouth every morning.  Max Daily Amount: 37.5 mg. 30 Tab 0    phentermine (ADIPEX-P) 37.5 mg tablet Take 1 Tab by mouth every morning. Max Daily Amount: 37.5 mg. 30 Tab 0    DULoxetine (CYMBALTA) 30 mg capsule Take 30 mg by mouth two (2) times a day.  tamsulosin (FLOMAX) 0.4 mg capsule Take 0.4 mg by mouth daily.  baclofen (LIORESAL) 10 mg tablet Take  by mouth three (3) times daily as needed.  phentermine (ADIPEX-P) 37.5 mg tablet Take 1 Tab by mouth every morning. Max Daily Amount: 37.5 mg. 30 Tab 0    pantoprazole (PROTONIX) 40 mg tablet Take 1 Tab by mouth daily. 40 Tab 2    oxyCODONE IR (ROXICODONE) 5 mg immediate release tablet Take 1-2 Tabs by mouth every three (3) hours as needed. Max Daily Amount: 80 mg. 70 Tab 0    ondansetron (ZOFRAN ODT) 8 mg disintegrating tablet Take 1 Tab by mouth every eight (8) hours as needed for Nausea. 30 Tab 0    cyclobenzaprine (FLEXERIL) 5 mg tablet Take 5 mg by mouth two (2) times daily as needed. Allergies   Allergen Reactions    Penicillin G Other (comments)     \"Swelling\" at age 3yo.        Past Medical History:   Diagnosis Date    Arthritis     Chronic pain     GERD (gastroesophageal reflux disease)     Stenosis of cervical spine with myelopathy ([facility] [address] 3/24/2016     Past Surgical History:   Procedure Laterality Date    HX ORTHOPAEDIC      Spinal Fusion    HX TONSILLECTOMY       Family History   Problem Relation Age of Onset    Heart defect Mother         IRREGULAR HEARTBEAT    Other Brother         PERIPHERAL ARTERY DISEASE    Anesth Problems Neg Hx      Social History     Tobacco Use    Smoking status: Current Every Day Smoker     Packs/day: 1.00     Years: 20.00     Pack years: 20.00    Smokeless tobacco: Never Used   Substance Use Topics    Alcohol use: Yes     Comment: MONTHLY      Lab Results   Component Value Date/Time    WBC 8.4 01/22/2019 03:09 PM    HGB 14.6 01/22/2019 03:09 PM    HCT 42.8 01/22/2019 03:09 PM    PLATELET 736 81/73/6374 03:09 PM    MCV 90 01/22/2019 03:09 PM     Lab Results   Component Value Date/Time TSH 4.560 (H) 01/22/2019 03:09 PM         Review of Systems   Constitutional: Negative for chills and fever. HENT: Negative for ear pain and nosebleeds. Eyes: Negative for blurred vision, pain and discharge. Respiratory: Negative for shortness of breath. Cardiovascular: Negative for chest pain and leg swelling. Gastrointestinal: Negative for constipation, diarrhea, nausea and vomiting. Genitourinary: Negative for frequency. Musculoskeletal: Negative for joint pain. Skin: Negative for itching and rash. Neurological: Negative for headaches. Psychiatric/Behavioral: Negative for depression. The patient is not nervous/anxious. Physical Exam   Constitutional: He is oriented to person, place, and time. He appears well-developed and well-nourished. HENT:   Head: Normocephalic and atraumatic. Mouth/Throat: No oropharyngeal exudate. Eyes: Conjunctivae and EOM are normal.   Neck: Normal range of motion. Neck supple. Cardiovascular: Normal rate, regular rhythm and normal heart sounds. No murmur heard. Pulmonary/Chest: Effort normal and breath sounds normal. No respiratory distress. Abdominal: Soft. Bowel sounds are normal. He exhibits no distension. There is no rebound. Musculoskeletal: He exhibits no edema or tenderness. Neurological: He is alert and oriented to person, place, and time. Skin: Skin is warm. No erythema. Psychiatric: He has a normal mood and affect. His behavior is normal.   Nursing note and vitals reviewed. ASSESSMENT and PLAN  Diagnoses and all orders for this visit:    1. Acute bronchitis due to other specified organisms  -     azithromycin (ZITHROMAX) 250 mg tablet; 2 first day then one tab daily till finished  -     guaiFENesin ER (MUCINEX) 600 mg ER tablet; Take 1 Tab by mouth two (2) times a day. 2. Allergic dermatitis due to poison ivy  -     methylPREDNISolone (MEDROL DOSEPACK) 4 mg tablet;  As directed for 6 days one package  - betamethasone dipropionate (DIPROSONE) 0.05 % topical cream; Apply  to affected area two (2) times a day. 3. Stenosis of cervical spine with myelopathy (HCC)  -     methylPREDNISolone (MEDROL DOSEPACK) 4 mg tablet; As directed for 6 days one package      She was told to have a low-fat low-cholesterol diet, include seafood such as adding fatty fish such as [place], [place], [place] to the diet, increase vegetables and fruits, nuts 3-4 times per week and finally have a low-salt and K rich food intake for a good 4-6 months possibly for ever for the best outcome, patient was told that either a DASH diet or Mediterranean diet but satisfies the need     Routine labs ordered, and the needed abnormal labs will be discussed soon and they can be repeated in 3-6 months. In addition relevant handouts were given to the patient for a better understanding,    patient was told to call if any problems. Patient acknowledged understanding and  agreed with today's recommendations.

## 2019-05-21 NOTE — PROGRESS NOTES
Name and  verified      Chief Complaint   Patient presents with    Rash     Groin/arms/back for one week patient reported. Health Maintenance reviewed-discussed with patient. 1. Have you been to the ER, urgent care clinic since your last visit? Hospitalized since your last visit? no    2. Have you seen or consulted any other health care providers outside of the [address] [place] [address] since your last visit? Include any pap smears or colon screening.  no

## 2019-07-31 ENCOUNTER — OFFICE VISIT (OUTPATIENT)
Dept: FAMILY MEDICINE CLINIC | Age: 49
End: 2019-07-31

## 2019-07-31 VITALS
RESPIRATION RATE: 20 BRPM | TEMPERATURE: 96 F | BODY MASS INDEX: 28.19 KG/M2 | HEIGHT: 72 IN | WEIGHT: 208.1 LBS | SYSTOLIC BLOOD PRESSURE: 130 MMHG | OXYGEN SATURATION: 100 % | HEART RATE: 72 BPM | DIASTOLIC BLOOD PRESSURE: 80 MMHG

## 2019-07-31 DIAGNOSIS — L23.7 ALLERGIC DERMATITIS DUE TO POISON IVY: ICD-10-CM

## 2019-07-31 DIAGNOSIS — B35.1 ONYCHOMYCOSIS OF LEFT GREAT TOE: Primary | ICD-10-CM

## 2019-07-31 DIAGNOSIS — J45.20 MILD INTERMITTENT ASTHMA WITHOUT COMPLICATION: ICD-10-CM

## 2019-07-31 RX ORDER — METHYLPREDNISOLONE 4 MG/1
TABLET ORAL
Qty: 1 DOSE PACK | Refills: 0 | Status: SHIPPED | OUTPATIENT
Start: 2019-07-31 | End: 2019-08-06 | Stop reason: SDUPTHER

## 2019-07-31 RX ORDER — ALBUTEROL SULFATE 90 UG/1
1 AEROSOL, METERED RESPIRATORY (INHALATION)
Qty: 1 INHALER | Refills: 1 | Status: SHIPPED | OUTPATIENT
Start: 2019-07-31 | End: 2019-12-09 | Stop reason: SDUPTHER

## 2019-07-31 RX ORDER — DEXAMETHASONE SODIUM PHOSPHATE 10 MG/ML
10 INJECTION INTRAMUSCULAR; INTRAVENOUS ONCE
Qty: 1 ML | Refills: 0
Start: 2019-07-31 | End: 2019-07-31

## 2019-07-31 RX ORDER — NYSTATIN 100000 U/G
CREAM TOPICAL 2 TIMES DAILY
Qty: 30 G | Refills: 2 | Status: SHIPPED | OUTPATIENT
Start: 2019-07-31

## 2019-07-31 RX ORDER — BETAMETHASONE DIPROPIONATE 0.5 MG/G
CREAM TOPICAL 2 TIMES DAILY
Qty: 45 G | Refills: 0
Start: 2019-07-31 | End: 2020-01-06 | Stop reason: ALTCHOICE

## 2019-07-31 NOTE — PROGRESS NOTES
HISTORY OF PRESENT ILLNESS  Epi Guillory is a 50 y.o. male. HPI   Rash of the neck, hands wrists chest arms, patient states that it all started few days ago after working in the backyard stating that the patient tried to clean some weeds and degrees around the bushes and around the backyard plants problem not better tried alcohol washing and OTC antibiotic ointments, does tingles and not pain full, states that is expanding red, and swelled up, patient states that mostly they are small fluid-filled vesicles multiple rounds, with a lot of itchiness, regardless stating that condition is not getting better, patient also states that the patient is up-to-date with all childhood vaccination and no family member having the same condition  Rash of the left great toenail started few weeks ago not better tried alcohol washing and OTC antibiotic ointments, dosenot tingles and not pain full, states that is notexpanding red, and not  swelled up, whitish patches rounds, with itchiness    Asthma  The patient has had no frequent daytime and nighttime asthma symptoms, patient currently has not been taking the short-acting bronchodilator over last couple weeks,  The patient is using short-acting beta agonists for symptom control , fortunately patient has no exacerbation over the last 12 months,  stating that last oral systemic corticosteroids was couple yrs ago,  patient currently has no wheezing, no dry cough cough, no sob,  Also stating that there has not been few night awakening for dyspnea over the last 30 days, compliant with meds, + RF needed,     Current Outpatient Medications   Medication Sig Dispense Refill    betamethasone dipropionate (DIPROSONE) 0.05 % topical cream Apply  to affected area two (2) times a day. 45 g 0    nystatin (MYCOSTATIN) topical cream Apply  to affected area two (2) times a day.  30 g 2    albuterol (PROVENTIL HFA, VENTOLIN HFA, PROAIR HFA) 90 mcg/actuation inhaler Take 1 Puff by inhalation every four (4) hours as needed for Wheezing. 1 Inhaler 1    methylPREDNISolone (MEDROL DOSEPACK) 4 mg tablet As directed for 6 days one package 1 Dose Pack 0    pregabalin (LYRICA) 300 mg capsule Take 300 mg by mouth daily.  azithromycin (ZITHROMAX) 250 mg tablet 2 first day then one tab daily till finished 6 Tab 0    guaiFENesin ER (MUCINEX) 600 mg ER tablet Take 1 Tab by mouth two (2) times a day. 15 Tab 0    rosuvastatin (CRESTOR) 10 mg tablet Take 1 Tab by mouth nightly. 30 Tab 6    phentermine (ADIPEX-P) 37.5 mg tablet Take 1 Tab by mouth every morning. Max Daily Amount: 37.5 mg. 30 Tab 0    aspirin delayed-release 81 mg tablet Take 1 Tab by mouth daily. 30 Tab 11    phentermine (ADIPEX-P) 37.5 mg tablet Take 1 Tab by mouth every morning. Max Daily Amount: 37.5 mg. 30 Tab 0    phentermine (ADIPEX-P) 37.5 mg tablet Take 1 Tab by mouth every morning. Max Daily Amount: 37.5 mg. 30 Tab 0    DULoxetine (CYMBALTA) 30 mg capsule Take 30 mg by mouth two (2) times a day.  tamsulosin (FLOMAX) 0.4 mg capsule Take 0.4 mg by mouth daily.  baclofen (LIORESAL) 10 mg tablet Take  by mouth three (3) times daily as needed.  phentermine (ADIPEX-P) 37.5 mg tablet Take 1 Tab by mouth every morning. Max Daily Amount: 37.5 mg. 30 Tab 0    pantoprazole (PROTONIX) 40 mg tablet Take 1 Tab by mouth daily. 40 Tab 2    ondansetron (ZOFRAN ODT) 8 mg disintegrating tablet Take 1 Tab by mouth every eight (8) hours as needed for Nausea. 30 Tab 0    cyclobenzaprine (FLEXERIL) 5 mg tablet Take 5 mg by mouth two (2) times daily as needed. Allergies   Allergen Reactions    Penicillin G Other (comments)     \"Swelling\" at age 5yo.        Past Medical History:   Diagnosis Date    Arthritis     Chronic pain     GERD (gastroesophageal reflux disease)     Stenosis of cervical spine with myelopathy ([facility] [address] 3/24/2016     Past Surgical History:   Procedure Laterality Date    HX ORTHOPAEDIC      Spinal Fusion    HX TONSILLECTOMY       Family History   Problem Relation Age of Onset    Heart defect Mother         IRREGULAR HEARTBEAT    Other Brother         PERIPHERAL ARTERY DISEASE    Anesth Problems Neg Hx      Social History     Tobacco Use    Smoking status: Current Every Day Smoker     Packs/day: 1.00     Years: 20.00     Pack years: 20.00    Smokeless tobacco: Never Used   Substance Use Topics    Alcohol use: Yes     Comment: MONTHLY      Lab Results   Component Value Date/Time    Hemoglobin A1c 5.0 03/16/2016 09:33 AM    Glucose 98 01/22/2019 03:09 PM    Creatinine 0.99 01/22/2019 03:09 PM      Lab Results   Component Value Date/Time    Cholesterol, total 179 01/22/2019 03:09 PM    HDL Cholesterol 30 (L) 01/22/2019 03:09 PM        Review of Systems   Constitutional: Negative for chills and fever. HENT: Negative for ear pain and nosebleeds. Eyes: Negative for blurred vision, pain and discharge. Respiratory: Negative for shortness of breath. Cardiovascular: Negative for chest pain and leg swelling. Gastrointestinal: Negative for constipation, diarrhea, nausea and vomiting. Genitourinary: Negative for frequency. Musculoskeletal: Negative for joint pain. Skin: Positive for itching and rash. Neurological: Negative for headaches. Psychiatric/Behavioral: Negative for depression. The patient is not nervous/anxious. Physical Exam   Constitutional: He is oriented to person, place, and time. He appears well-developed and well-nourished. HENT:   Head: Normocephalic and atraumatic. Mouth/Throat: No oropharyngeal exudate. Eyes: Conjunctivae and EOM are normal.   Neck: Normal range of motion. Neck supple. Cardiovascular: Normal rate, regular rhythm and normal heart sounds. No murmur heard. Pulmonary/Chest: Effort normal and breath sounds normal. No respiratory distress. Abdominal: Soft. Bowel sounds are normal. He exhibits no distension. There is no rebound.    Musculoskeletal: He exhibits no edema or tenderness. Neurological: He is alert and oriented to person, place, and time. Skin: Skin is warm. Rash noted. There is erythema. Psychiatric: He has a normal mood and affect. His behavior is normal.   Nursing note and vitals reviewed. ASSESSMENT and PLAN  Diagnoses and all orders for this visit:    1. Onychomycosis of left great toe  -     betamethasone dipropionate (DIPROSONE) 0.05 % topical cream; Apply  to affected area two (2) times a day. -     nystatin (MYCOSTATIN) topical cream; Apply  to affected area two (2) times a day. -     methylPREDNISolone (MEDROL DOSEPACK) 4 mg tablet; As directed for 6 days one package    2. Allergic dermatitis due to poison ivy  -     betamethasone dipropionate (DIPROSONE) 0.05 % topical cream; Apply  to affected area two (2) times a day. -     nystatin (MYCOSTATIN) topical cream; Apply  to affected area two (2) times a day. -     methylPREDNISolone (MEDROL DOSEPACK) 4 mg tablet; As directed for 6 days one package    3. Mild intermittent asthma without complication  -     betamethasone dipropionate (DIPROSONE) 0.05 % topical cream; Apply  to affected area two (2) times a day. -     nystatin (MYCOSTATIN) topical cream; Apply  to affected area two (2) times a day. -     albuterol (PROVENTIL HFA, VENTOLIN HFA, PROAIR HFA) 90 mcg/actuation inhaler; Take 1 Puff by inhalation every four (4) hours as needed for Wheezing.  -     methylPREDNISolone (MEDROL DOSEPACK) 4 mg tablet;  As directed for 6 days one package

## 2019-07-31 NOTE — PATIENT INSTRUCTIONS
Poison VALERIEZARAMARINA, [place], and Elsy Joedon Toenail Fungus: Care Instructions Your Care Instructions A toenail that is infected by a fungus usually turns white or yellow. As the fungus spreads, the nail turns a darker color and gets thicker, and its edges start to turn ragged and crumble. A bad infection can cause toe pain, and the nail may pull away from the toe. Toenails that are exposed to moisture and warmth a lot are more likely to get infected by a fungus. This can happen from wearing sweaty shoes often and from walking barefoot on shower floors. It is hard to treat toenail fungus, and the infection can return after it has cleared up. But medicines can sometimes get rid of toenail fungus for good. If the infection is very bad, or if it causes a lot of pain, you may need to have the nail removed. Follow-up care is a key part of your treatment and safety. Be sure to make and go to all appointments, and call your doctor if you are having problems. It's also a good idea to know your test results and keep a list of the medicines you take. How can you care for yourself at home? · Take your medicines exactly as prescribed. Call your doctor if you have any problems with your medicine. You will get more details on the specific medicines your doctor prescribes. · If your doctor gave you a cream or liquid to put on your toenail, use it exactly as directed. · Wash your feet often, and wash your hands after touching your feet. · Keep your toenails clean and dry. Dry your feet completely after you bathe and before you put on shoes and socks. · Keep your toenails trimmed. · Change socks often. Wear dry socks that absorb moisture. · Do not go barefoot in public places. · Use a spray or powder that fights fungus on your feet and in your shoes. · Do not pick at the skin around your nails. · Do not use nail polish or fake nails on your toenails. When should you call for help? Call your doctor now or seek immediate medical care if: 
  · You have signs of infection, such as: 
? Increased pain, swelling, warmth, or redness. ? Red streaks leading from the site. ? Pus draining from the site. ? A fever.  
  · You have new or increased toe pain.  
 Watch closely for changes in your health, and be sure to contact your doctor if: 
  · You do not get better as expected. Where can you learn more? Go to http://monica-zachery.info/. Enter D202 in the search box to learn more about \"Toenail Fungus: Care Instructions. \" Current as of: April 1, 2019 Content Version: 12.1 © 9218-9472 "[employer]". Care instructions adapted under license by [employer] (which disclaims liability or warranty for this information). If you have questions about a medical condition or this instruction, always ask your healthcare professional. [place] [address] any warranty or liability for your use of this information. umac: Care Instructions Your Care Instructions Poison ivy, poison oak, and poison sumac are plants that can cause a skin rash upon contact. The red, itchy rash often shows up in lines or streaks and may cause fluid-filled blisters or large, raised hives. The rash is caused by an allergic reaction to an oil in poison ivy, oak, and sumac. The rash may occur when you touch the plant or when you touch clothing, pet fur, sporting gear, gardening tools, or other objects that have come in contact with one of these plants. You cannot catch or spread the rash, even if you touch it or the blister fluid, because the plant oil will already have been absorbed or washed off the skin. The rash may seem to be spreading, but either it is still developing from earlier contact or you have touched something that still has the plant oil on it. Follow-up care is a key part of your treatment and safety.  Be sure to make and go to all appointments, and call your doctor if you are having problems. It's also a good idea to know your test results and keep a list of the medicines you take. How can you care for yourself at home? · If your doctor prescribed a cream, use it as directed. If your doctor prescribed medicine, take it exactly as prescribed. Call your doctor if you think you are having a problem with your medicine. · Use cold, wet cloths to reduce itching. · Keep cool, and stay out of the sun. · Leave the rash open to the air. · Wash all clothing or other things that may have come in contact with the plant oil. · Avoid most lotions and ointments until the rash heals. Calamine lotion may help relieve symptoms of a plant rash. Use it 3 or 4 times a day. To prevent poison ivy exposure If you know that you will be near poison ivy, oak, or sumac, you can try these options: · Use a product designed to help prevent plant oil from getting on the skin. These products, such as Ivy X Pre-Contact Skin Solution, come in lotions, sprays, or towelettes. You put the product on your skin right before you go outdoors. · If you did not use a preventive product and you have had contact with plant oil, clean it off your skin as soon as possible. Use a product such as Tecnu Original Outdoor Skin Cleanser. These products can also be used to clean plant oil from clothing or tools. When should you call for help? Call your doctor now or seek immediate medical care if: 
  · Your rash gets worse, and you start to feel bad and have a fever, a stiff neck, nausea, and vomiting.  
  · You have signs of infection, such as: 
? Increased pain, swelling, warmth, or redness. ? Red streaks leading from the rash. ? Pus draining from the rash. ? A fever.  
 Watch closely for changes in your health, and be sure to contact your doctor if: 
  · You have new blisters or bruises, or the rash spreads and looks like a sunburn.   · The rash gets worse, or it comes back after nearly disappearing.  
  · You think a medicine you are using is making your rash worse.  
  · Your rash does not clear up after 1 to 2 weeks of home treatment.  
  · You have joint aches or body aches with your rash. Where can you learn more? Go to http://monica-zachery.info/. Enter A705 in the search box to learn more about \"Poison [place], [place], and [place]ac: Care Instructions. \" Current as of: April 1, 2019 Content Version: 12.1 © 5512-0504 [employer]. Care instructions adapted under license by [employer] (which disclaims liability or warranty for this information). If you have questions about a medical condition or this instruction, always ask your healthcare professional. [address] any warranty or liability for your use of this information. Asthma: Your Action Plan Sample Action Plan Controller medicine action plan Fill in the blank spaces and boxes that apply for all sections. · Name of your controller medicine: 
? ____________________________________________ · How much of this medicine do you take? ? ____________________________________________ · How often do you take this medicine? ? ____________________________________________ · Other instructions? ? ____________________________________________ Quick-relief medicine action plan · Name of your quick-relief medicine: 
? ____________________________________________ · How much of this medicine do you take? ? ____________________________________________ · How often do you take this medicine? ? ____________________________________________ Asthma Zones GREEN ZONE: This is where you want to be! Green zone symptoms · You have no shortness of breath or chest tightness. You are not coughing or wheezing. · You can do all of your usual activities. · You sleep well at night. Green zone peak flow (if you use a peak flow meter) · ______ or more (80% or more of your personal best) Green zone actions (Check the boxes and fill in the blank spaces that apply.) [ ] You take your controller medicine(s) every day. [ ] Chandra Garrett are staying away from your asthma triggers. [ ] You take quick-relief medicine (called _____________________) ______ minutes before exercise. YELLOW ZONE: Your asthma is getting worse. Yellow zone symptoms · You are short of breath or have chest tightness. You are coughing or wheezing. · You have symptoms that keep you up at night. · You can do some, but not all, of your usual activities. Yellow zone peak flow (if you use a peak flow meter) · ______ to ______ (50% to 79% of your personal best) Yellow zone actions (Check the boxes and fill in the blank spaces that apply.) [ ] Take _____ puff(s) of quick-relief medicine called ______________________. Repeat _____ times. [ ] If your symptoms don't get better or your peak flow has not returned to the green zone in 1 hour, then: · [ ] Take _____ puff(s) of medicine called ______________________. Take it ____ times a day. · [ ] Begin or increase treatment with corticosteroid pills. Take ______ mg of medicine called ____________________________ every __________. · [ ] Call your doctor at this number: ____________________. RED ZONE: Danger! Red zone symptoms · You are very short of breath. · You can't do your usual activities. · Quick-relief medicine doesn't help. Or your symptoms don't get better after 24 hours in the yellow zone. Red zone peak flow (if you use a peak flow meter) · Less than _______ (less than 50% of your personal best) Red zone actions (Check the boxes and fill in the blank spaces that apply.) [ ] Take _____ puff(s) of quick-relief medicine called ____________________________. Repeat ______ times. [ ] Begin or increase treatment with corticosteroid pills. Take ________ mg now. [ ] Call your doctor at this number: _________________. If you can't contact your doctor, go to the emergency department. Call 911 or ___________________. [ ] Other numbers you might call are: ___________________________________. When should you call for help? Call 911 anytime you think you may need emergency care. For example, call if: 
· You have severe trouble breathing. Call your doctor now or seek immediate medical care if: 
· You are in the red zone of your asthma action plan. · You've used your quick-relief medicine but are still having trouble breathing. · You cough up blood. · You have new or worse trouble breathing. · You cough up dark brown or bloody mucus (sputum). Watch closely for changes in your health, and be sure to contact your doctor if: 
· You need to use quick-relief medicine more than 2 days each week (unless it's just for exercise). · Your coughing and wheezing get worse. Follow-up care is a key part of your treatment and safety. Be sure to make and go to all appointments, and call your doctor if you are having problems. It's also a good idea to know your test results and keep a list of the medicines you take. Where can you learn more? Go to http://monica-zachery.info/. Enter 97 56 45 in the search box to learn more about \"Asthma: Your Action Plan. \" Current as of: September 5, 2018 Content Version: 12.1 © 9622-6552 [employer]. Care instructions adapted under license by [employer] (which disclaims liability or warranty for this information). If you have questions about a medical condition or this instruction, always ask your healthcare professional. [address] any warranty or liability for your use of this information. How to Give an Intramuscular Shot: Care Instructions Overview An intramuscular shot is an injection of medicine into a muscle.  Some medicinessuch as the hormone testosterone or fertility medicinesneed to be injected into a muscle to work. This type of shot is usually given in the thigh or hip. If it's easier, you may want to have someone else give you the shot in your hip. At first, you may be nervous about giving yourself a shot. But soon, giving the shot will become routine. Follow-up care is a key part of your treatment and safety. Be sure to make and go to all appointments, and call your doctor if you are having problems. It's also a good idea to know your test results and keep a list of the medicines you take. How can you give yourself an intramuscular shot? Follow your health professional's instructions for where and how often to inject your medicine. Your nurse will show you how to give yourself the shot. 1. Gather your equipment. This includes your syringe (containing medicine) and an alcohol wipe or a cotton ball dipped in alcohol. 2. Wash your hands with soap and running water. Dry them well. 3. Choose a spot on your thigh or behind your hip for the shot. Check to make sure that the muscle isn't already sore from activity. If it is sore, you could have pain in the area after giving the shot. If the muscle is sore, pick a new area for the shot. 4. Use alcohol to clean the skin. Let it dry. 5. Remove the cap from the needle. 6. Hold the syringe like a dart close to the site. Keep your fingers off the plunger. 7. Stretch the skin flat using your fingers and thumb of one hand. 8. Place the syringe at a 90-degree angle to the shot site. The needle should stand straight up from the skin. 9. Quickly jab the needle all the way into the skin. 10. Hold the syringe in place with one hand, and pull back on the plunger with the other hand. Pull the plunger slowly. If you see blood in the syringe, you have hit a blood vessel. If you hit a vessel, pull the needle out of the skin.  Get rid of the needle and syringe, and prepare a new syringe with medicine. Insert the new needle in a different spot, and check again to see if there is blood. 11. If there's no blood in the syringe, slowly push the plunger all the way in. This way, the medicine goes into the muscle. 12. Take the needle out at the same angle that you inserted it. 13. If you bleed a little, apply pressure over the shot area. You can use your finger, a cotton ball, or a piece of gauze. To help avoid bruising, don't rub the area. 14. Dispose of the needle safely. Don't use the same needle more than one time. Slightly change the spot where you give the shot each time you do it. When should you call for help? Watch closely for changes in your health, and be sure to contact your doctor if you have any problems. Where can you learn more? Go to http://monica-zachery.info/. Enter V637 in the search box to learn more about \"How to Give an Intramuscular Shot: Care Instructions. \" Current as of: July 25, 2018 Content Version: 12.1 © 7690-8860 [email or web address]. Care instructions adapted under license by [employer] (which disclaims liability or warranty for this information). If you have questions about a medical condition or this instruction, always ask your healthcare professional. [address] any warranty or liability for your use of this information.

## 2019-07-31 NOTE — PROGRESS NOTES
Name and  verified      Chief Complaint   Patient presents with    Poison Ivy/Poison Oak/Poison Sumac Exposure     Eye left and arms and legs    Nail Problem     left great toe         Health Maintenance reviewed-discussed with patient. 1. Have you been to the ER, urgent care clinic since your last visit? Hospitalized since your last visit? no    2. Have you seen or consulted any other health care providers outside of the [address] [place] [address] since your last visit? Include any pap smears or colon screening.   no

## 2019-07-31 NOTE — PROGRESS NOTES
Order placed for decadron 10 mg/ml  per Verbal Order from Dr. Bairon Reid on 7/31/2019 due to poison ivy    After obtaining consent, and per orders of , decadron 10mg/ml  given to  Left deltoid  IM  . Patient instructed to remain in clinic for 15 minutes afterwards, and to report any adverse reaction to me immediately. Patient did not have any adverse reactions during this office visit.

## 2019-08-06 DIAGNOSIS — B35.1 ONYCHOMYCOSIS OF LEFT GREAT TOE: ICD-10-CM

## 2019-08-06 DIAGNOSIS — J45.20 MILD INTERMITTENT ASTHMA WITHOUT COMPLICATION: ICD-10-CM

## 2019-08-06 DIAGNOSIS — L23.7 ALLERGIC DERMATITIS DUE TO POISON IVY: ICD-10-CM

## 2019-08-06 RX ORDER — METHYLPREDNISOLONE 4 MG/1
TABLET ORAL
Qty: 1 DOSE PACK | Refills: 0 | Status: SHIPPED | OUTPATIENT
Start: 2019-08-06 | End: 2019-08-22 | Stop reason: ALTCHOICE

## 2019-08-06 NOTE — TELEPHONE ENCOUNTER
Patient wants to get the medication for methylPREDNISolone (MEDROL DOSEPACK) 4 mg tablet, he still has poison ivy.   Please give him a call @ [phone]

## 2019-08-19 ENCOUNTER — OFFICE VISIT (OUTPATIENT)
Dept: FAMILY MEDICINE CLINIC | Age: 49
End: 2019-08-19

## 2019-08-19 VITALS
TEMPERATURE: 98.1 F | SYSTOLIC BLOOD PRESSURE: 107 MMHG | WEIGHT: 209.4 LBS | HEART RATE: 100 BPM | BODY MASS INDEX: 28.36 KG/M2 | HEIGHT: 72 IN | OXYGEN SATURATION: 95 % | DIASTOLIC BLOOD PRESSURE: 83 MMHG | RESPIRATION RATE: 20 BRPM

## 2019-08-19 DIAGNOSIS — A31.1: ICD-10-CM

## 2019-08-19 DIAGNOSIS — L03.113 CELLULITIS OF RIGHT UPPER EXTREMITY: ICD-10-CM

## 2019-08-19 DIAGNOSIS — A31.1 CUTANEOUS MYCOBACTERIUM MARINUM INFECTION: Primary | ICD-10-CM

## 2019-08-19 DIAGNOSIS — L02.91 ABSCESS: ICD-10-CM

## 2019-08-19 DIAGNOSIS — M79.10 MYALGIA: ICD-10-CM

## 2019-08-19 RX ORDER — SULFAMETHOXAZOLE AND TRIMETHOPRIM 800; 160 MG/1; MG/1
1 TABLET ORAL 2 TIMES DAILY
Qty: 20 TAB | Refills: 0 | Status: SHIPPED | OUTPATIENT
Start: 2019-08-19 | End: 2019-08-29

## 2019-08-19 RX ORDER — HYDROCODONE BITARTRATE AND ACETAMINOPHEN 5; 325 MG/1; MG/1
1 TABLET ORAL
Qty: 20 TAB | Refills: 0 | Status: SHIPPED | OUTPATIENT
Start: 2019-08-19 | End: 2019-08-22

## 2019-08-19 RX ORDER — AMOXICILLIN AND CLAVULANATE POTASSIUM 500; 125 MG/1; MG/1
TABLET, FILM COATED ORAL 3 TIMES DAILY
COMMUNITY
End: 2019-08-22 | Stop reason: ALTCHOICE

## 2019-08-19 NOTE — PROGRESS NOTES
Name and  verified      Chief Complaint   Patient presents with    Hand Swelling     Left for 2 days         Health Maintenance reviewed-discussed with patient. 1. Have you been to the ER, urgent care clinic since your last visit? Hospitalized since your last visit? no    2. Have you seen or consulted any other health care providers outside of the [address] [place] [address] since your last visit? Include any pap smears or colon screening.  no

## 2019-08-19 NOTE — PROGRESS NOTES
HISTORY OF PRESENT ILLNESS  Jack Silver is a 50 y.o. male. HPI   Rash on the rt shoulder and left palmar hand and another abscess on the left hand extendign to the left arm, got scratch,  fresh and salt water, A swimming poolr, Started 2 wks  ago not better, the patient has tried alcohol washing and OTC antibiotic ointments,  no family member have the same problem, it does tingles and it is pain full, states that is expanding red, and is swelled up, like a lump, The main symptom is a reddish bump that slowly grows into a purplish and painful nodule. Rt Shoulder and left arm Pain at the sites of infection   The history is provided by the patient. This is a acute problem. Episode onset: few yrs ago, not obese, patient has a lot of difficulty with overhead activity, raising arm is very painful and the pain  awakens the patient at night,  The problem occurs constantly. The problem has not changed since onset. The pain is present in the rt shoulder, left arm . The quality of the pain is described as dull. The pain is at a severity of 8/10. Current Outpatient Medications   Medication Sig Dispense Refill    amoxicillin-clavulanate (AUGMENTIN) 500-125 mg per tablet Take  by mouth three (3) times daily.  albuterol (PROVENTIL HFA, VENTOLIN HFA, PROAIR HFA) 90 mcg/actuation inhaler Take 1 Puff by inhalation every four (4) hours as needed for Wheezing. 1 Inhaler 1    aspirin delayed-release 81 mg tablet Take 1 Tab by mouth daily. 30 Tab 11    pregabalin (LYRICA) 300 mg capsule Take 300 mg by mouth daily.  baclofen (LIORESAL) 10 mg tablet Take  by mouth three (3) times daily as needed.  phentermine (ADIPEX-P) 37.5 mg tablet Take 1 Tab by mouth every morning. Max Daily Amount: 37.5 mg. 30 Tab 0    methylPREDNISolone (MEDROL DOSEPACK) 4 mg tablet As directed for 6 days one package 1 Dose Pack 0    betamethasone dipropionate (DIPROSONE) 0.05 % topical cream Apply  to affected area two (2) times a day. 45 g 0    nystatin (MYCOSTATIN) topical cream Apply  to affected area two (2) times a day. 30 g 2    azithromycin (ZITHROMAX) 250 mg tablet 2 first day then one tab daily till finished 6 Tab 0    guaiFENesin ER (MUCINEX) 600 mg ER tablet Take 1 Tab by mouth two (2) times a day. 15 Tab 0    rosuvastatin (CRESTOR) 10 mg tablet Take 1 Tab by mouth nightly. 30 Tab 6    phentermine (ADIPEX-P) 37.5 mg tablet Take 1 Tab by mouth every morning. Max Daily Amount: 37.5 mg. 30 Tab 0    phentermine (ADIPEX-P) 37.5 mg tablet Take 1 Tab by mouth every morning. Max Daily Amount: 37.5 mg. 30 Tab 0    phentermine (ADIPEX-P) 37.5 mg tablet Take 1 Tab by mouth every morning. Max Daily Amount: 37.5 mg. 30 Tab 0    DULoxetine (CYMBALTA) 30 mg capsule Take 30 mg by mouth two (2) times a day.  tamsulosin (FLOMAX) 0.4 mg capsule Take 0.4 mg by mouth daily.  pantoprazole (PROTONIX) 40 mg tablet Take 1 Tab by mouth daily. 40 Tab 2    ondansetron (ZOFRAN ODT) 8 mg disintegrating tablet Take 1 Tab by mouth every eight (8) hours as needed for Nausea. 30 Tab 0    cyclobenzaprine (FLEXERIL) 5 mg tablet Take 5 mg by mouth two (2) times daily as needed. Allergies   Allergen Reactions    Penicillin G Other (comments)     \"Swelling\" at age 5yo.        Past Medical History:   Diagnosis Date    Arthritis     Chronic pain     GERD (gastroesophageal reflux disease)     Stenosis of cervical spine with myelopathy ([facility] [address] 3/24/2016     Past Surgical History:   Procedure Laterality Date    HX ORTHOPAEDIC      Spinal Fusion    HX TONSILLECTOMY       Family History   Problem Relation Age of Onset    Heart defect Mother         IRREGULAR HEARTBEAT    Other Brother         PERIPHERAL ARTERY DISEASE    Anesth Problems Neg Hx      Social History     Tobacco Use    Smoking status: Current Every Day Smoker     Packs/day: 1.00     Years: 20.00     Pack years: 20.00    Smokeless tobacco: Never Used   Substance Use Topics    Alcohol use: Yes     Comment: MONTHLY      Lab Results   Component Value Date/Time    Hemoglobin A1c 5.0 03/16/2016 09:33 AM    Glucose 98 01/22/2019 03:09 PM    Creatinine 0.99 01/22/2019 03:09 PM      Lab Results   Component Value Date/Time    Cholesterol, total 179 01/22/2019 03:09 PM    HDL Cholesterol 30 (L) 01/22/2019 03:09 PM        Review of Systems   Constitutional: Negative for chills and fever. HENT: Negative for ear pain and nosebleeds. Eyes: Negative for blurred vision, pain and discharge. Respiratory: Negative for shortness of breath. Cardiovascular: Negative for chest pain and leg swelling. Gastrointestinal: Negative for constipation, diarrhea, nausea and vomiting. Genitourinary: Negative for frequency. Musculoskeletal: Positive for joint pain, myalgias and neck pain. Skin: Positive for rash. Negative for itching. Neurological: Negative for headaches. Psychiatric/Behavioral: Negative for depression. The patient is not nervous/anxious. Physical Exam   Constitutional: He is oriented to person, place, and time. He appears well-developed and well-nourished. HENT:   Head: Normocephalic and atraumatic. Mouth/Throat: No oropharyngeal exudate. Eyes: Conjunctivae and EOM are normal.   Neck: Normal range of motion. Neck supple. Cardiovascular: Normal rate, regular rhythm and normal heart sounds. No murmur heard. Pulmonary/Chest: Effort normal and breath sounds normal. No respiratory distress. Abdominal: Soft. Bowel sounds are normal. He exhibits no distension. There is no rebound. Musculoskeletal: He exhibits tenderness. He exhibits no edema. Neurological: He is alert and oriented to person, place, and time. Skin: Skin is warm. Rash noted. There is erythema. Psychiatric: He has a normal mood and affect. His behavior is normal.   Nursing note and vitals reviewed. ASSESSMENT and PLAN  Diagnoses and all orders for this visit:    1.  Cutaneous Mycobacterium marinum infection  -     trimethoprim-sulfamethoxazole (BACTRIM DS, SEPTRA DS) 160-800 mg per tablet; Take 1 Tab by mouth two (2) times a day for 10 days.  -     HYDROcodone-acetaminophen (NORCO) 5-325 mg per tablet; Take 1 Tab by mouth every eight (8) hours as needed for Pain for up to 3 days. Max Daily Amount: 3 Tabs. -     AEROBIC BACTERIAL CULTURE    2. Abscess  -     trimethoprim-sulfamethoxazole (BACTRIM DS, SEPTRA DS) 160-800 mg per tablet; Take 1 Tab by mouth two (2) times a day for 10 days.  -     HYDROcodone-acetaminophen (NORCO) 5-325 mg per tablet; Take 1 Tab by mouth every eight (8) hours as needed for Pain for up to 3 days. Max Daily Amount: 3 Tabs. -     AEROBIC BACTERIAL CULTURE    3. Cellulitis of right upper extremity  -     trimethoprim-sulfamethoxazole (BACTRIM DS, SEPTRA DS) 160-800 mg per tablet; Take 1 Tab by mouth two (2) times a day for 10 days.  -     HYDROcodone-acetaminophen (NORCO) 5-325 mg per tablet; Take 1 Tab by mouth every eight (8) hours as needed for Pain for up to 3 days. Max Daily Amount: 3 Tabs. -     AEROBIC BACTERIAL CULTURE    4. Myalgia  -     trimethoprim-sulfamethoxazole (BACTRIM DS, SEPTRA DS) 160-800 mg per tablet; Take 1 Tab by mouth two (2) times a day for 10 days.  -     HYDROcodone-acetaminophen (NORCO) 5-325 mg per tablet; Take 1 Tab by mouth every eight (8) hours as needed for Pain for up to 3 days. Max Daily Amount: 3 Tabs. -     AEROBIC BACTERIAL CULTURE    5. Swimming pool granuloma disease  -     trimethoprim-sulfamethoxazole (BACTRIM DS, SEPTRA DS) 160-800 mg per tablet; Take 1 Tab by mouth two (2) times a day for 10 days.  -     HYDROcodone-acetaminophen (NORCO) 5-325 mg per tablet; Take 1 Tab by mouth every eight (8) hours as needed for Pain for up to 3 days. Max Daily Amount: 3 Tabs.   -     AEROBIC BACTERIAL CULTURE

## 2019-08-19 NOTE — PATIENT INSTRUCTIONS
Body Mass Index: Care Instructions  Your Care Instructions    Body mass index (BMI) can help you see if your weight is raising your risk for health problems. It uses a formula to compare how much you weigh with how tall you are. · A BMI lower than 18.5 is considered underweight. · A BMI between 18.5 and 24.9 is considered healthy. · A BMI between 25 and 29.9 is considered overweight. A BMI of 30 or higher is considered obese. If your BMI is in the normal range, it means that you have a lower risk for weight-related health problems. If your BMI is in the overweight or obese range, you may be at increased risk for weight-related health problems, such as high blood pressure, heart disease, stroke, arthritis or joint pain, and diabetes. If your BMI is in the underweight range, you may be at increased risk for health problems such as fatigue, lower protection (immunity) against illness, muscle loss, bone loss, hair loss, and hormone problems. BMI is just one measure of your risk for weight-related health problems. You may be at higher risk for health problems if you are not active, you eat an unhealthy diet, or you drink too much alcohol or use tobacco products. Follow-up care is a key part of your treatment and safety. Be sure to make and go to all appointments, and call your doctor if you are having problems. It's also a good idea to know your test results and keep a list of the medicines you take. How can you care for yourself at home? · Practice healthy eating habits. This includes eating plenty of fruits, vegetables, whole grains, lean protein, and low-fat dairy. · If your doctor recommends it, get more exercise. Walking is a good choice. Bit by bit, increase the amount you walk every day. Try for at least 30 minutes on most days of the week. · Do not smoke. Smoking can increase your risk for health problems. If you need help quitting, talk to your doctor about stop-smoking programs and medicines. These can increase your chances of quitting for good. · Limit alcohol to 2 drinks a day for men and 1 drink a day for women. Too much alcohol can cause health problems. If you have a BMI higher than 25  · Your doctor may do other tests to check your risk for weight-related health problems. This may include measuring the distance around your waist. A waist measurement of more than 40 inches in men or 35 inches in women can increase the risk of weight-related health problems. · Talk with your doctor about steps you can take to stay healthy or improve your health. You may need to make lifestyle changes to lose weight and stay healthy, such as changing your diet and getting regular exercise. If you have a BMI lower than 18.5  · Your doctor may do other tests to check your risk for health problems. · Talk with your doctor about steps you can take to stay healthy or improve your health. You may need to make lifestyle changes to gain or maintain weight and stay healthy, such as getting more healthy foods in your diet and doing exercises to build muscle. Where can you learn more? Go to http://monica-zachery.info/. Enter S176 in the search box to learn more about \"Body Mass Index: Care Instructions. \"  Current as of: October 13, 2016  Content Version: 11.4  © 2669-1161 [employer]. Care instructions adapted under license by [employer] (which disclaims liability or warranty for this information). If you have questions about a medical condition or this instruction, always ask your healthcare professional. [address] any warranty or liability for your use of this information.

## 2019-08-22 ENCOUNTER — OFFICE VISIT (OUTPATIENT)
Dept: FAMILY MEDICINE CLINIC | Age: 49
End: 2019-08-22

## 2019-08-22 VITALS
OXYGEN SATURATION: 95 % | SYSTOLIC BLOOD PRESSURE: 98 MMHG | WEIGHT: 213 LBS | DIASTOLIC BLOOD PRESSURE: 65 MMHG | BODY MASS INDEX: 28.85 KG/M2 | RESPIRATION RATE: 20 BRPM | HEIGHT: 72 IN | HEART RATE: 76 BPM | TEMPERATURE: 96.7 F

## 2019-08-22 DIAGNOSIS — M79.642 PAIN OF LEFT HAND: ICD-10-CM

## 2019-08-22 DIAGNOSIS — M79.602 ARM PAIN, ANTERIOR, LEFT: ICD-10-CM

## 2019-08-22 DIAGNOSIS — L02.91 ABSCESS: ICD-10-CM

## 2019-08-22 DIAGNOSIS — L02.519 CELLULITIS AND ABSCESS OF HAND: ICD-10-CM

## 2019-08-22 DIAGNOSIS — L02.91 ABSCESS: Primary | ICD-10-CM

## 2019-08-22 DIAGNOSIS — L03.119 CELLULITIS AND ABSCESS OF HAND: ICD-10-CM

## 2019-08-22 RX ORDER — TRAMADOL HYDROCHLORIDE 50 MG/1
50 TABLET ORAL
Qty: 21 TAB | Refills: 0 | Status: SHIPPED | OUTPATIENT
Start: 2019-08-22 | End: 2019-08-29

## 2019-08-22 RX ORDER — INDOMETHACIN 50 MG/1
CAPSULE ORAL
Qty: 270 CAP | Refills: 0 | Status: SHIPPED | OUTPATIENT
Start: 2019-08-22 | End: 2020-04-10 | Stop reason: ALTCHOICE

## 2019-08-22 RX ORDER — DOXYCYCLINE 100 MG/1
100 CAPSULE ORAL 2 TIMES DAILY
Qty: 24 CAP | Refills: 0 | Status: SHIPPED | OUTPATIENT
Start: 2019-08-22 | End: 2019-09-03

## 2019-08-22 RX ORDER — INDOMETHACIN 50 MG/1
50 CAPSULE ORAL 3 TIMES DAILY
Qty: 18 CAP | Refills: 0 | Status: SHIPPED | OUTPATIENT
Start: 2019-08-22 | End: 2019-08-22 | Stop reason: SDUPTHER

## 2019-08-22 NOTE — PROGRESS NOTES
Name and  verified      Chief Complaint   Patient presents with    Hand Swelling     Left         Health Maintenance reviewed-discussed with patient. 1. Have you been to the ER, urgent care clinic since your last visit? Hospitalized since your last visit? no    2. Have you seen or consulted any other health care providers outside of the [address] [place] [address] since your last visit? Include any pap smears or colon screening.  no

## 2019-08-22 NOTE — PROGRESS NOTES
HISTORY OF PRESENT ILLNESS  Ravinder Alcala is a 50 y.o. male. 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 patient will present today stating that he has been compliant with that given antibiotic right upper shoulder abscess has gone away left hand abscess it is worsening the pain extending from the left hand all the way to the left shoulder requesting pain medication stating that current pain medication is not strong enough pain is 10 out of 10 unable to sleep unable to do any activity of his living he has become so handicapped secondary to the infection, patient states that unable to take the Tylenol he gets headache if he takes Tylenol he requested to be on a straight oxycodone or any opiate-based medication without Tylenol stating his condition on the right side gone away but the left side is worsening patient has no discharge overall has improved his has only been since Monday that he was started on the correct therapy,   IMPRESSION of his today's x-ray:   1. No fracture, osteomyelitis, or soft tissue gas. 2. Old capsular injury to the fourth PIP joint. Current Outpatient Medications   Medication Sig Dispense Refill    doxycycline (VIBRAMYCIN) 100 mg capsule Take 1 Cap by mouth two (2) times a day for 12 days. 24 Cap 0    indomethacin (INDOCIN) 50 mg capsule Take 1 Cap by mouth three (3) times daily for 6 days. 18 Cap 0    traMADol (ULTRAM) 50 mg tablet Take 1 Tab by mouth every eight (8) hours as needed for Pain for up to 7 days. Max Daily Amount: 150 mg. 21 Tab 0    amoxicillin-clavulanate (AUGMENTIN) 500-125 mg per tablet Take  by mouth three (3) times daily.  trimethoprim-sulfamethoxazole (BACTRIM DS, SEPTRA DS) 160-800 mg per tablet Take 1 Tab by mouth two (2) times a day for 10 days.  20 Tab 0    HYDROcodone-acetaminophen (NORCO) 5-325 mg per tablet Take 1 Tab by mouth every eight (8) hours as needed for Pain for up to 3 days. Max Daily Amount: 3 Tabs. 20 Tab 0    methylPREDNISolone (MEDROL DOSEPACK) 4 mg tablet As directed for 6 days one package 1 Dose Pack 0    betamethasone dipropionate (DIPROSONE) 0.05 % topical cream Apply  to affected area two (2) times a day. 45 g 0    nystatin (MYCOSTATIN) topical cream Apply  to affected area two (2) times a day. 30 g 2    albuterol (PROVENTIL HFA, VENTOLIN HFA, PROAIR HFA) 90 mcg/actuation inhaler Take 1 Puff by inhalation every four (4) hours as needed for Wheezing. 1 Inhaler 1    azithromycin (ZITHROMAX) 250 mg tablet 2 first day then one tab daily till finished 6 Tab 0    guaiFENesin ER (MUCINEX) 600 mg ER tablet Take 1 Tab by mouth two (2) times a day. 15 Tab 0    rosuvastatin (CRESTOR) 10 mg tablet Take 1 Tab by mouth nightly. 30 Tab 6    aspirin delayed-release 81 mg tablet Take 1 Tab by mouth daily. 30 Tab 11    DULoxetine (CYMBALTA) 30 mg capsule Take 30 mg by mouth two (2) times a day.  pregabalin (LYRICA) 300 mg capsule Take 300 mg by mouth daily.  tamsulosin (FLOMAX) 0.4 mg capsule Take 0.4 mg by mouth daily.  baclofen (LIORESAL) 10 mg tablet Take  by mouth three (3) times daily as needed.  pantoprazole (PROTONIX) 40 mg tablet Take 1 Tab by mouth daily. 40 Tab 2    ondansetron (ZOFRAN ODT) 8 mg disintegrating tablet Take 1 Tab by mouth every eight (8) hours as needed for Nausea. 30 Tab 0    cyclobenzaprine (FLEXERIL) 5 mg tablet Take 5 mg by mouth two (2) times daily as needed. Allergies   Allergen Reactions    Penicillin G Other (comments)     \"Swelling\" at age 5yo.        Past Medical History:   Diagnosis Date    Arthritis     Chronic pain     GERD (gastroesophageal reflux disease)     Stenosis of cervical spine with myelopathy ([facility] [address] 3/24/2016     Past Surgical History:   Procedure Laterality Date    HX ORTHOPAEDIC      Spinal Fusion  HX TONSILLECTOMY       Family History   Problem Relation Age of Onset    Heart defect Mother         IRREGULAR HEARTBEAT    Other Brother         PERIPHERAL ARTERY DISEASE    Anesth Problems Neg Hx      Social History     Tobacco Use    Smoking status: Current Every Day Smoker     Packs/day: 1.00     Years: 20.00     Pack years: 20.00    Smokeless tobacco: Never Used   Substance Use Topics    Alcohol use: Yes     Comment: MONTHLY      Lab Results   Component Value Date/Time    WBC 8.4 01/22/2019 03:09 PM    HGB 14.6 01/22/2019 03:09 PM    HCT 42.8 01/22/2019 03:09 PM    PLATELET 603 64/75/4471 03:09 PM    MCV 90 01/22/2019 03:09 PM     Lab Results   Component Value Date/Time    TSH 4.560 (H) 01/22/2019 03:09 PM         Review of Systems   Constitutional: Negative for chills and fever. HENT: Negative for ear pain and nosebleeds. Eyes: Negative for blurred vision, pain and discharge. Respiratory: Negative for shortness of breath. Cardiovascular: Negative for chest pain and leg swelling. Gastrointestinal: Negative for constipation, diarrhea, nausea and vomiting. Genitourinary: Negative for frequency. Musculoskeletal: Negative for joint pain. Skin: Negative for itching and rash. Neurological: Negative for headaches. Psychiatric/Behavioral: Negative for depression. The patient is not nervous/anxious. Physical Exam   Constitutional: He is oriented to person, place, and time. He appears well-developed and well-nourished. HENT:   Head: Normocephalic and atraumatic. Mouth/Throat: No oropharyngeal exudate. Eyes: Conjunctivae and EOM are normal.   Neck: Normal range of motion. Neck supple. Cardiovascular: Normal rate, regular rhythm and normal heart sounds. No murmur heard. Pulmonary/Chest: Effort normal and breath sounds normal. No respiratory distress. Abdominal: Soft. Bowel sounds are normal. He exhibits no distension. There is no rebound.    Musculoskeletal: He exhibits no edema or tenderness. Neurological: He is alert and oriented to person, place, and time. Skin: Skin is warm. No erythema. Psychiatric: He has a normal mood and affect. His behavior is normal.   Nursing note and vitals reviewed. ASSESSMENT and PLAN  Diagnoses and all orders for this visit:    1. Abscess  -     doxycycline (VIBRAMYCIN) 100 mg capsule; Take 1 Cap by mouth two (2) times a day for 12 days. -     XR HAND LT MIN 3 V; Future  -     traMADol (ULTRAM) 50 mg tablet; Take 1 Tab by mouth every eight (8) hours as needed for Pain for up to 7 days. Max Daily Amount: 150 mg.    2. Pain of left hand  -     doxycycline (VIBRAMYCIN) 100 mg capsule; Take 1 Cap by mouth two (2) times a day for 12 days. -     XR HAND LT MIN 3 V; Future  -     traMADol (ULTRAM) 50 mg tablet; Take 1 Tab by mouth every eight (8) hours as needed for Pain for up to 7 days. Max Daily Amount: 150 mg.    3. Arm pain, anterior, left  -     doxycycline (VIBRAMYCIN) 100 mg capsule; Take 1 Cap by mouth two (2) times a day for 12 days. -     XR HAND LT MIN 3 V; Future  -     traMADol (ULTRAM) 50 mg tablet; Take 1 Tab by mouth every eight (8) hours as needed for Pain for up to 7 days. Max Daily Amount: 150 mg.    4. Cellulitis and abscess of hand  -     doxycycline (VIBRAMYCIN) 100 mg capsule; Take 1 Cap by mouth two (2) times a day for 12 days. -     XR HAND LT MIN 3 V; Future  -     traMADol (ULTRAM) 50 mg tablet; Take 1 Tab by mouth every eight (8) hours as needed for Pain for up to 7 days. Max Daily Amount: 150 mg.       On physical exam patient condition has improved since Monday unfortunately the pain in the left hand left arm left shoulder as per the patient worsening patient was told if his condition does not get better he needs to go to emergency room patient acknowledged understanding agree with today's recommendation

## 2019-08-23 PROBLEM — M79.602 ARM PAIN, ANTERIOR, LEFT: Status: ACTIVE | Noted: 2019-08-23

## 2019-08-23 LAB
BACTERIA SPEC AEROBE CULT: ABNORMAL
BACTERIA SPEC AEROBE CULT: ABNORMAL

## 2019-08-23 NOTE — PATIENT INSTRUCTIONS
Skin Abscess: Care Instructions  Your Care Instructions    A skin abscess is a bacterial infection that forms a pocket of pus. A boil is a kind of skin abscess. The doctor may have cut an opening in the abscess so that the pus can drain out. You may have gauze in the cut so that the abscess will stay open and keep draining. You may need antibiotics. You will need to follow up with your doctor to make sure the infection has gone away. The doctor has checked you carefully, but problems can develop later. If you notice any problems or new symptoms, get medical treatment right away. Follow-up care is a key part of your treatment and safety. Be sure to make and go to all appointments, and call your doctor if you are having problems. It's also a good idea to know your test results and keep a list of the medicines you take. How can you care for yourself at home? · Apply warm and dry compresses, a heating pad set on low, or a hot water bottle 3 or 4 times a day for pain. Keep a cloth between the heat source and your skin. · If your doctor prescribed antibiotics, take them as directed. Do not stop taking them just because you feel better. You need to take the full course of antibiotics. · Take pain medicines exactly as directed. ? If the doctor gave you a prescription medicine for pain, take it as prescribed. ? If you are not taking a prescription pain medicine, ask your doctor if you can take an over-the-counter medicine. · Keep your bandage clean and dry. Change the bandage whenever it gets wet or dirty, or at least one time a day. · If the abscess was packed with gauze:  ? Keep follow-up appointments to have the gauze changed or removed. If the doctor instructed you to remove the gauze, follow the instructions you were given for how to remove it. ? After the gauze is removed, soak the area in warm water for 15 to 20 minutes 2 times a day, until the wound closes. When should you call for help?   Call your doctor now or seek immediate medical care if:    · You have signs of worsening infection, such as:  ? Increased pain, swelling, warmth, or redness. ? Red streaks leading from the infected skin. ? Pus draining from the wound. ? A fever.    Watch closely for changes in your health, and be sure to contact your doctor if:    · You do not get better as expected. Where can you learn more? Go to http://monica-zachery.info/. Enter O730 in the search box to learn more about \"Skin Abscess: Care Instructions. \"  Current as of: April 1, 2019  Content Version: 12.1  © 1618-8594 [employer]. Care instructions adapted under license by [facility] (which disclaims liability or warranty for this information). If you have questions about a medical condition or this instruction, always ask your healthcare professional. [address] any warranty or liability for your use of this information.

## 2019-12-09 DIAGNOSIS — J45.20 MILD INTERMITTENT ASTHMA WITHOUT COMPLICATION: ICD-10-CM

## 2019-12-09 RX ORDER — ALBUTEROL SULFATE 90 UG/1
AEROSOL, METERED RESPIRATORY (INHALATION)
Qty: 18 G | Refills: 0 | Status: SHIPPED | OUTPATIENT
Start: 2019-12-09 | End: 2020-01-06 | Stop reason: SDUPTHER

## 2020-01-06 ENCOUNTER — OFFICE VISIT (OUTPATIENT)
Dept: FAMILY MEDICINE CLINIC | Age: 50
End: 2020-01-06

## 2020-01-06 VITALS
WEIGHT: 215.3 LBS | HEART RATE: 83 BPM | DIASTOLIC BLOOD PRESSURE: 78 MMHG | TEMPERATURE: 97.4 F | HEIGHT: 72 IN | SYSTOLIC BLOOD PRESSURE: 114 MMHG | OXYGEN SATURATION: 96 % | BODY MASS INDEX: 29.16 KG/M2 | RESPIRATION RATE: 20 BRPM

## 2020-01-06 DIAGNOSIS — L20.82 FLEXURAL ECZEMA: Primary | ICD-10-CM

## 2020-01-06 DIAGNOSIS — J45.20 MILD INTERMITTENT ASTHMA WITHOUT COMPLICATION: ICD-10-CM

## 2020-01-06 DIAGNOSIS — Z72.0 TOBACCO ABUSE DISORDER: ICD-10-CM

## 2020-01-06 RX ORDER — CLOBETASOL PROPIONATE 0.5 MG/G
CREAM TOPICAL 2 TIMES DAILY
Qty: 60 G | Refills: 2 | Status: SHIPPED | OUTPATIENT
Start: 2020-01-06

## 2020-01-06 RX ORDER — ALBUTEROL SULFATE 90 UG/1
AEROSOL, METERED RESPIRATORY (INHALATION)
Qty: 18 G | Refills: 12 | Status: SHIPPED | OUTPATIENT
Start: 2020-01-06 | End: 2021-03-04

## 2020-01-06 NOTE — PROGRESS NOTES
HISTORY OF PRESENT ILLNESS  Debby Andres is a 52 y.o. male. HPI   Rash of the hands  Started few weeks ago not better tried alcohol washing and OTC antibiotic ointments, dosenot tingles and not pain full, states that is notexpanding red, and not  swelled up, whitish patches rounds, with itchiness  Needs refill for adilene no dyspnea no wheezing    Current Outpatient Medications   Medication Sig Dispense Refill    VENTOLIN HFA 90 mcg/actuation inhaler INHALE 1 PUFF BY MOUTH EVERY 4 HOURS AS NEEDED FOR WHEEZING 18 g 0    betamethasone dipropionate (DIPROSONE) 0.05 % topical cream Apply  to affected area two (2) times a day. 45 g 0    aspirin delayed-release 81 mg tablet Take 1 Tab by mouth daily. 30 Tab 11    pregabalin (LYRICA) 300 mg capsule Take 300 mg by mouth daily.  baclofen (LIORESAL) 10 mg tablet Take  by mouth three (3) times daily as needed.  indomethacin (INDOCIN) 50 mg capsule TAKE 1 CAPSULE BY MOUTH THREE TIMES DAILY FOR 6 DAYS 270 Cap 0    nystatin (MYCOSTATIN) topical cream Apply  to affected area two (2) times a day. 30 g 2    guaiFENesin ER (MUCINEX) 600 mg ER tablet Take 1 Tab by mouth two (2) times a day. 15 Tab 0    rosuvastatin (CRESTOR) 10 mg tablet Take 1 Tab by mouth nightly. 30 Tab 6    DULoxetine (CYMBALTA) 30 mg capsule Take 30 mg by mouth two (2) times a day.  tamsulosin (FLOMAX) 0.4 mg capsule Take 0.4 mg by mouth daily.  pantoprazole (PROTONIX) 40 mg tablet Take 1 Tab by mouth daily. 40 Tab 2    ondansetron (ZOFRAN ODT) 8 mg disintegrating tablet Take 1 Tab by mouth every eight (8) hours as needed for Nausea. 30 Tab 0    cyclobenzaprine (FLEXERIL) 5 mg tablet Take 5 mg by mouth two (2) times daily as needed. Allergies   Allergen Reactions    Penicillin G Other (comments)     \"Swelling\" at age 3yo.        Past Medical History:   Diagnosis Date    Arm pain, anterior, left 8/23/2019    Arthritis     Chronic pain     GERD (gastroesophageal reflux disease)  [facility]     Past Surgical History:   Procedure Laterality Date    HX ORTHOPAEDIC      Spinal Fusion    HX TONSILLECTOMY       Family History   Problem Relation Age of Onset    Heart defect Mother         IRREGULAR HEARTBEAT    Other Brother         PERIPHERAL ARTERY DISEASE    Anesth Problems Neg Hx      Social History     Tobacco Use    Smoking status: Current Every Day Smoker     Packs/day: 1.00     Years: 20.00     Pack years: 20.00    Smokeless tobacco: Never Used   Substance Use Topics    Alcohol use: Yes     Comment: MONTHLY      Lab Results   Component Value Date/Time    Hemoglobin A1c 5.0 03/16/2016 09:33 AM    Glucose 98 01/22/2019 03:09 PM    Creatinine 0.99 01/22/2019 03:09 PM      Lab Results   Component Value Date/Time    TSH 4.560 (H) 01/22/2019 03:09 PM         Review of Systems   Constitutional: Negative for chills and fever. HENT: Negative for ear pain and nosebleeds. Eyes: Negative for blurred vision, pain and discharge. Respiratory: Negative for shortness of breath. Cardiovascular: Negative for chest pain and leg swelling. Gastrointestinal: Negative for constipation, diarrhea, nausea and vomiting. Genitourinary: Negative for frequency. Musculoskeletal: Negative for joint pain. Skin: Positive for itching and rash. Neurological: Negative for headaches. Psychiatric/Behavioral: Negative for depression. The patient is not nervous/anxious. Physical Exam  Vitals signs and nursing note reviewed. Constitutional:       Appearance: He is well-developed. HENT:      Head: Normocephalic and atraumatic. Mouth/Throat:      Pharynx: No oropharyngeal exudate. Eyes:      Conjunctiva/sclera: Conjunctivae normal.   Neck:      Musculoskeletal: Normal range of motion and neck supple. Cardiovascular:      Rate and Rhythm: Normal rate and regular rhythm. Heart sounds: Normal heart sounds. No murmur.    Pulmonary: Effort: Pulmonary effort is normal. No respiratory distress. Breath sounds: Normal breath sounds. Abdominal:      General: Bowel sounds are normal. There is no distension. Palpations: Abdomen is soft. Tenderness: There is no rebound. Musculoskeletal:         General: No tenderness. Skin:     General: Skin is dry. Findings: Erythema and rash present. Neurological:      Mental Status: He is alert and oriented to person, place, and time. Psychiatric:         Behavior: Behavior normal.         ASSESSMENT and PLAN  Diagnoses and all orders for this visit:    1. Flexural eczema  -     clobetasol (TEMOVATE) 0.05 % topical cream; Apply  to affected area two (2) times a day. 2. Tobacco abuse disorder  -     nicotine 10 mg/mL spry; 1 Act by Nasal route two (2) times daily as needed (prn). -     albuterol (VENTOLIN HFA) 90 mcg/actuation inhaler; INHALE 1 PUFF BY MOUTH EVERY 4 HOURS AS NEEDED FOR WHEEZING    3.  Mild intermittent asthma without complication  -     albuterol (VENTOLIN HFA) 90 mcg/actuation inhaler; INHALE 1 PUFF BY MOUTH EVERY 4 HOURS AS NEEDED FOR WHEEZING

## 2020-01-06 NOTE — PATIENT INSTRUCTIONS
Atopic Dermatitis: Care Instructions  Your Care Instructions  Atopic dermatitis (also called eczema) is a skin problem that causes intense itching and a red, raised rash. In severe cases, the rash develops clear fluidfilled blisters. The rash is not contagious. People with this condition seem to have very sensitive immune systems that are likely to react to things that cause allergies. The immune system is the body's way of fighting infection. There is no cure for atopic dermatitis, but you may be able to control it with care at home. Follow-up care is a key part of your treatment and safety. Be sure to make and go to all appointments, and call your doctor if you are having problems. It's also a good idea to know your test results and keep a list of the medicines you take. How can you care for yourself at home? · Use moisturizer at least twice a day. · If your doctor prescribes a cream, use it as directed. If your doctor prescribes other medicine, take it exactly as directed. · Wash the affected area with water only. Soap can make dryness and itching worse. Pat dry. · Apply a moisturizer after bathing. Use a cream such as Lubriderm, Moisturel, or Cetaphil that does not irritate the skin or cause a rash. Apply the cream while your skin is still damp after lightly drying with a towel. · Use cold, wet cloths to reduce itching. · Keep cool, and stay out of the sun. · If itching affects your normal activities, an over-the-counter antihistamine, such as diphenhydramine (Benadryl) or loratadine (Claritin) may help. Read and follow all instructions on the label. When should you call for help? Call your doctor now or seek immediate medical care if:    · Your rash gets worse and you have a fever.     · You have new blisters or bruises, or the rash spreads and looks like a sunburn.     · You have signs of infection, such as:  ? Increased pain, swelling, warmth, or redness.   ? Red streaks leading from the rash.  ? Pus draining from the rash. ? A fever.     · You have crusting or oozing sores.     · You have joint aches or body aches along with your rash.    Watch closely for changes in your health, and be sure to contact your doctor if:    · Your rash does not clear up after 2 to 3 weeks of home treatment.     · Itching interferes with your sleep or daily activities. Where can you learn more? Go to http://monica-zachery.info/. Enter K959 in the search box to learn more about \"Atopic Dermatitis: Care Instructions. \"  Current as of: April 1, 2019  Content Version: 12.2  © 0315-3779 [employer]. Care instructions adapted under license by [employer] (which disclaims liability or warranty for this information). If you have questions about a medical condition or this instruction, always ask your healthcare professional. [address] any warranty or liability for your use of this information.

## 2020-04-10 ENCOUNTER — VIRTUAL VISIT (OUTPATIENT)
Dept: FAMILY MEDICINE CLINIC | Age: 50
End: 2020-04-10

## 2020-04-10 DIAGNOSIS — G99.2 STENOSIS OF CERVICAL SPINE WITH MYELOPATHY (HCC): Primary | ICD-10-CM

## 2020-04-10 DIAGNOSIS — M48.02 STENOSIS OF CERVICAL SPINE WITH MYELOPATHY (HCC): Primary | ICD-10-CM
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
## 2020-04-10 DIAGNOSIS — Z71.89 ADVICE GIVEN ABOUT COVID-19 VIRUS INFECTION: ICD-10-CM

## 2020-04-10 DIAGNOSIS — M25.551 ACUTE PAIN OF RIGHT HIP: ICD-10-CM

## 2020-04-10 RX ORDER — BACLOFEN 10 MG/1
10 TABLET ORAL
Qty: 21 TAB | Refills: 0 | Status: SHIPPED | OUTPATIENT
Start: 2020-04-10 | End: 2020-04-17

## 2020-04-10 RX ORDER — ACETAMINOPHEN AND CODEINE PHOSPHATE 300; 30 MG/1; MG/1
1 TABLET ORAL
Qty: 21 TAB | Refills: 0 | Status: SHIPPED | OUTPATIENT
Start: 2020-04-10 | End: 2020-04-17

## 2020-04-10 RX ORDER — INDOMETHACIN 50 MG/1
50 CAPSULE ORAL 3 TIMES DAILY
Qty: 18 CAP | Refills: 0 | Status: SHIPPED | OUTPATIENT
Start: 2020-04-10 | End: 2020-04-16

## 2020-04-10 RX ORDER — DICLOFENAC SODIUM 10 MG/G
4 GEL TOPICAL 4 TIMES DAILY
Qty: 100 G | Refills: 1 | Status: SHIPPED | OUTPATIENT
Start: 2020-04-10

## 2020-04-10 NOTE — PROGRESS NOTES
Chief Complaint   Patient presents with    Follow-up     Patient is here with complaints of right hip pain x 2 weeks post fall no fever noted is taking Tylenol for pain      1. Have you been to the ER, urgent care clinic since your last visit? Hospitalized since your last visit? No    2. Have you seen or consulted any other health care providers outside of the [address] [place] [address] since your last visit? Include any pap smears or colon screening.  No

## 2020-04-10 NOTE — PROGRESS NOTES
HISTORY OF PRESENT ILLNESS  Peewee Donald is a 52 y.o. male. HPI  Today's Pt main concerns were provided on virtual visit and telemed format, pt consented to such visit secondary to the current national and state emergency in order to decrease the transmission rate of the corona virus,  pt is w/ comorbid history at high risk    Present on VV for the concerns for the current medical conditions and stating that the pt has had no traveling and unaware if the pt has been exposed to covid-19 individual, pt is not currently working, and having no work concerns, not asthmatic, no wheezing not tobacco abuser,  has no fever no cough no dyspnea, no ha, not dizzy, nl smell nl taste, no abd upset,  Not feeling anxious,    no sinus congestion, ++rt joint pain and ache    HIP  Pain, s/p fall from a ladder helping friend the head was not hit from 6 foot ladder, trying to drill a whole, the pain not allowing him to sleep,The history is provided by the patient. This is a chronic problem. Episode onset: 3yrs ago, pt's MBI is at 25 pt  is working at this time but is cumbersome for the patient, to do any mopping, heavy pushing and lifting, , pt is currently able to walk w/ any mobility device. and the patient states that the pain is worsening specially by going up and down the steps, and havign a limp. The problem occurs constantly. The problem has changed and worsening since onset. The pain is present in the RT Hip area. is a dull pain and  is at a severity of 8/10. the pt has the AM stiffness, but denies numbness and tingling sensations  There has been no history of extremity trauma.  no incontinence of urine nor of stool, and no lower ext Weaknesses  Feels like a lump on the rt hip, does not want to go to ER , refusing to get it xrayed, even though pt informed about possibility for fx , lies to treat his injury with pain meds       Current Outpatient Medications   Medication Sig Dispense Refill    nicotine 10 mg/mL spry 1 Act by Nasal route two (2) times daily as needed (prn). 10 mL 3    clobetasol (TEMOVATE) 0.05 % topical cream Apply  to affected area two (2) times a day. 60 g 2    indomethacin (INDOCIN) 50 mg capsule TAKE 1 CAPSULE BY MOUTH THREE TIMES DAILY FOR 6 DAYS 270 Cap 0    nystatin (MYCOSTATIN) topical cream Apply  to affected area two (2) times a day. 30 g 2    guaiFENesin ER (MUCINEX) 600 mg ER tablet Take 1 Tab by mouth two (2) times a day. 15 Tab 0    rosuvastatin (CRESTOR) 10 mg tablet Take 1 Tab by mouth nightly. 30 Tab 6    aspirin delayed-release 81 mg tablet Take 1 Tab by mouth daily. 30 Tab 11    DULoxetine (CYMBALTA) 30 mg capsule Take 30 mg by mouth two (2) times a day.  pregabalin (LYRICA) 300 mg capsule Take 300 mg by mouth daily.  tamsulosin (FLOMAX) 0.4 mg capsule Take 0.4 mg by mouth daily.  baclofen (LIORESAL) 10 mg tablet Take  by mouth three (3) times daily as needed.  pantoprazole (PROTONIX) 40 mg tablet Take 1 Tab by mouth daily. 40 Tab 2    ondansetron (ZOFRAN ODT) 8 mg disintegrating tablet Take 1 Tab by mouth every eight (8) hours as needed for Nausea. 30 Tab 0    cyclobenzaprine (FLEXERIL) 5 mg tablet Take 5 mg by mouth two (2) times daily as needed.  albuterol (VENTOLIN HFA) 90 mcg/actuation inhaler INHALE 1 PUFF BY MOUTH EVERY 4 HOURS AS NEEDED FOR WHEEZING 18 g 12     Allergies   Allergen Reactions    Penicillin G Other (comments)     \"Swelling\" at age 3yo.        Past Medical History:   Diagnosis Date    Arm pain, anterior, left 8/23/2019    Arthritis     Chronic pain     GERD (gastroesophageal reflux disease)     Stenosis of cervical spine with myelopathy (HCC) 3/24/2016     Past Surgical History:   Procedure Laterality Date    HX ORTHOPAEDIC      Spinal Fusion    HX TONSILLECTOMY       Family History   Problem Relation Age of Onset    Heart defect Mother         IRREGULAR HEARTBEAT    Other Brother         PERIPHERAL ARTERY DISEASE    Anesth Problems Neg Hx Social History     Tobacco Use    Smoking status: Current Every Day Smoker     Packs/day: 1.00     Years: 20.00     Pack years: 20.00    Smokeless tobacco: Never Used   Substance Use Topics    Alcohol use: Yes     Comment: MONTHLY      Lab Results   Component Value Date/Time    WBC 8.4 01/22/2019 03:09 PM    HGB 14.6 01/22/2019 03:09 PM    HCT 42.8 01/22/2019 03:09 PM    PLATELET 724 23/55/3452 03:09 PM    MCV 90 01/22/2019 03:09 PM        Review of Systems   Constitutional: Negative for chills and fever. HENT: Negative for congestion, ear pain and nosebleeds. Eyes: Negative for blurred vision, pain and discharge. Respiratory: Negative for cough, shortness of breath and wheezing. Cardiovascular: Negative for chest pain and leg swelling. Gastrointestinal: Negative for constipation, diarrhea, nausea and vomiting. Genitourinary: Negative for dysuria and frequency. Musculoskeletal: Positive for back pain. Negative for joint pain and myalgias. Skin: Negative for itching and rash. Neurological: Positive for weakness. Negative for dizziness, loss of consciousness and headaches. Psychiatric/Behavioral: Negative for depression. The patient is not nervous/anxious and does not have insomnia. Physical Exam  Vitals signs and nursing note reviewed. Constitutional:       Appearance: He is well-developed. HENT:      Head: Normocephalic and atraumatic. Mouth/Throat:      Pharynx: Oropharyngeal exudate present. Abdominal:      Palpations: Abdomen is soft. Musculoskeletal:         General: Tenderness, deformity and signs of injury present. Thoracic back: He exhibits decreased range of motion, tenderness, pain and spasm. Lumbar back: He exhibits decreased range of motion, tenderness, bony tenderness, pain and spasm. Left foot: Normal range of motion and normal capillary refill. No tenderness, bony tenderness or swelling. Skin:     Findings: No erythema.    Neurological: Mental Status: He is alert. ASSESSMENT and PLAN  Diagnoses and all orders for this visit:    1. Stenosis of cervical spine with myelopathy (HCC)  -     baclofen (LIORESAL) 10 mg tablet; Take 1 Tab by mouth three (3) times daily as needed for Muscle Spasm(s) or Pain for up to 7 days. -     acetaminophen-codeine (Tylenol-Codeine #3) 300-30 mg per tablet; Take 1 Tab by mouth three (3) times daily as needed for Pain for up to 7 days. Max Daily Amount: 3 Tabs. -     diclofenac (VOLTAREN) 1 % gel; Apply 4 g to affected area four (4) times daily. -     indomethacin (INDOCIN) 50 mg capsule; Take 1 Cap by mouth three (3) times daily for 6 days. 2. Acute pain of right hip  -     baclofen (LIORESAL) 10 mg tablet; Take 1 Tab by mouth three (3) times daily as needed for Muscle Spasm(s) or Pain for up to 7 days. -     acetaminophen-codeine (Tylenol-Codeine #3) 300-30 mg per tablet; Take 1 Tab by mouth three (3) times daily as needed for Pain for up to 7 days. Max Daily Amount: 3 Tabs. -     diclofenac (VOLTAREN) 1 % gel; Apply 4 g to affected area four (4) times daily. -     indomethacin (INDOCIN) 50 mg capsule; Take 1 Cap by mouth three (3) times daily for 6 days. 3. Fall (on) (from) other stairs and steps, initial encounter  -     baclofen (LIORESAL) 10 mg tablet; Take 1 Tab by mouth three (3) times daily as needed for Muscle Spasm(s) or Pain for up to 7 days. -     acetaminophen-codeine (Tylenol-Codeine #3) 300-30 mg per tablet; Take 1 Tab by mouth three (3) times daily as needed for Pain for up to 7 days. Max Daily Amount: 3 Tabs. -     diclofenac (VOLTAREN) 1 % gel; Apply 4 g to affected area four (4) times daily. -     indomethacin (INDOCIN) 50 mg capsule; Take 1 Cap by mouth three (3) times daily for 6 days.     4. Advice Given About Covid-19 Virus Infection      Patient was told to lessen the amount of pain medication continue with weight reduction do some massage therapy chiropractor exercise therapy such as resistance banding avoid heavy lifting heavy pushing at this time include ibuprofen and Tylenol over-the-counter topical cream , patient was told to take some Tums or over-the-counter PPI if abdominal upset do ice therapy, side effect of current pain medication significantly including its devastating addiction, explained in detail   in addition, pt was told to avoid machinary operation and driving while on any pain based medications that will cause dizziness, drowsiness, and sleepiness. Dependency and tolerancy were also addressed,  meds side effects and compliancy advised,     Concern abdout COVID-19 addressed and detailed, pt was told that the best way to prevent illness is to avoid being exposed to this virus, by clean hands often, use a hand  that contains at least 60% alcohol, Avoid touching your eyes, nose, and mouth with unwashed hand, Avoid close contact with people who are sick , Put distance between yourself and other people, Stay home if you are sick, except to get medical care, Cover your mouth and nose, Throw used tissues in the trash, Wear a facemask if you are sick,     Pursuant to the emergency declaration under the [address] and [facility] waiver authority and the [employer] and [employer] Act, this Virtual Visit was conducted, with patient's consent, to reduce the patient's risk of exposure to COVID-19 and provide continuity of care for an established patient. Pt was also told if develop dyspnea needs to call 911 or go to er, call for silvestre advise, pt agreed with todays recommendations,     Services were provided through a video synchronous discussion virtually to substitute for in-person appointment.

## 2020-04-23 ENCOUNTER — VIRTUAL VISIT (OUTPATIENT)
Dept: FAMILY MEDICINE CLINIC | Age: 50
End: 2020-04-23

## 2020-04-23 DIAGNOSIS — G99.2 STENOSIS OF CERVICAL SPINE WITH MYELOPATHY (HCC): Primary | ICD-10-CM
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
## 2020-04-23 DIAGNOSIS — M48.02 STENOSIS OF CERVICAL SPINE WITH MYELOPATHY (HCC): Primary | ICD-10-CM

## 2020-04-23 DIAGNOSIS — M25.551 ACUTE PAIN OF RIGHT HIP: ICD-10-CM

## 2020-04-23 DIAGNOSIS — G35 MULTIPLE SCLEROSIS (HCC): ICD-10-CM

## 2020-04-23 RX ORDER — INDOMETHACIN 50 MG/1
50 CAPSULE ORAL
Qty: 30 CAP | Refills: 1 | Status: SHIPPED | OUTPATIENT
Start: 2020-04-23 | End: 2020-05-23

## 2020-04-23 RX ORDER — ACETAMINOPHEN AND CODEINE PHOSPHATE 300; 30 MG/1; MG/1
1 TABLET ORAL
Qty: 30 TAB | Refills: 0 | Status: SHIPPED | OUTPATIENT
Start: 2020-04-23 | End: 2020-05-23

## 2020-04-23 RX ORDER — MINERAL OIL
180 ENEMA (ML) RECTAL
Qty: 30 TAB | Refills: 2 | Status: SHIPPED | OUTPATIENT
Start: 2020-04-23

## 2020-04-23 RX ORDER — NALOXONE HYDROCHLORIDE 4 MG/.1ML
SPRAY NASAL
Qty: 2 EACH | Refills: 1 | Status: SHIPPED | OUTPATIENT
Start: 2020-04-23

## 2020-04-23 NOTE — PROGRESS NOTES
Chief Complaint   Patient presents with    Hip Pain     fell about 1-2 months, has been having pain since      Having trouble sleeping with the pain. 1. Have you been to the ER, urgent care clinic since your last visit? Hospitalized since your last visit? no    2. Have you seen or consulted any other health care providers outside of the [address] [place] [address] since your last visit? Include any pap smears or colon screening.   no

## 2020-04-23 NOTE — PROGRESS NOTES
HISTORY OF PRESENT ILLNESS  Kaleigh Wright is a 52 y.o. male. HPI    Today's Pt main concerns were provided on virtual visit and Video Audio telemed format, pt consented to such visit secondary to the current national and state emergency in order to decrease the transmission rate of the corona virus,  pt is w/ comorbid history at high risk    Present on VV for the concerns for the current medical conditions and stating that the pt has had no traveling and unaware if the pt has been exposed to covid-19 individual, pt is not currently working, and having no work concerns, not asthmatic, no wheezing not tobacco abuser,  has no fever no cough no dyspnea, no ha, not dizzy, nl smell nl taste, no abd upset,  Not feeling anxious,  Have been staying at home for couple of wks, not    no body ache    HIP  pain  The history is provided by the patient. This is a chronic problem. Episode onset:3yrs ago,  pt  is working at this time but is cumbersome for the patient, to do any mopping, heavy pushing and lifting, , pt is currently able to walk w/out any mobility device. and the patient states that the pain is worsening specially by going up and down the steps . The problem occurs constantly. The problem has changed and worsening since onset. The pain is present in the RT Hip area. is a dull pain and  is at a severity of 9/10. the pt has the AM stiffness, but denies numbness and tingling sensations  There has been no history of extremity trauma. no incontinence of urine nor of stool, and no lower ext Weaknesses  Current Outpatient Medications   Medication Sig Dispense Refill    diclofenac (VOLTAREN) 1 % gel Apply 4 g to affected area four (4) times daily. 100 g 1    nicotine 10 mg/mL spry 1 Act by Nasal route two (2) times daily as needed (prn).  10 mL 3    albuterol (VENTOLIN HFA) 90 mcg/actuation inhaler INHALE 1 PUFF BY MOUTH EVERY 4 HOURS AS NEEDED FOR WHEEZING 18 g 12    nystatin (MYCOSTATIN) topical cream Apply  to affected area two (2) times a day. 30 g 2    guaiFENesin ER (MUCINEX) 600 mg ER tablet Take 1 Tab by mouth two (2) times a day. 15 Tab 0    pregabalin (LYRICA) 300 mg capsule Take 300 mg by mouth daily.  tamsulosin (FLOMAX) 0.4 mg capsule Take 0.4 mg by mouth daily.  clobetasol (TEMOVATE) 0.05 % topical cream Apply  to affected area two (2) times a day. 60 g 2    rosuvastatin (CRESTOR) 10 mg tablet Take 1 Tab by mouth nightly. 30 Tab 6    aspirin delayed-release 81 mg tablet Take 1 Tab by mouth daily. 30 Tab 11    DULoxetine (CYMBALTA) 30 mg capsule Take 30 mg by mouth two (2) times a day.  pantoprazole (PROTONIX) 40 mg tablet Take 1 Tab by mouth daily. 40 Tab 2    ondansetron (ZOFRAN ODT) 8 mg disintegrating tablet Take 1 Tab by mouth every eight (8) hours as needed for Nausea. 30 Tab 0     Allergies   Allergen Reactions    Penicillin G Other (comments)     \"Swelling\" at age 5yo.        Past Medical History:   Diagnosis Date    Arm pain, anterior, left 8/23/2019    Arthritis     Chronic pain     GERD (gastroesophageal reflux disease)     Stenosis of cervical spine with myelopathy (HCC) 3/24/2016     Past Surgical History:   Procedure Laterality Date    HX ORTHOPAEDIC      Spinal Fusion    HX TONSILLECTOMY       Family History   Problem Relation Age of Onset    Heart defect Mother         IRREGULAR HEARTBEAT    Other Brother         PERIPHERAL ARTERY DISEASE    Anesth Problems Neg Hx      Social History     Tobacco Use    Smoking status: Current Every Day Smoker     Packs/day: 1.00     Years: 20.00     Pack years: 20.00    Smokeless tobacco: Never Used   Substance Use Topics    Alcohol use: Yes     Comment: MONTHLY      Lab Results   Component Value Date/Time    Hemoglobin A1c 5.0 03/16/2016 09:33 AM    Glucose 98 01/22/2019 03:09 PM    Creatinine 0.99 01/22/2019 03:09 PM      Lab Results   Component Value Date/Time    TSH 4.560 (H) 01/22/2019 03:09 PM         Review of Systems Constitutional: Negative for chills and fever. HENT: Negative for ear pain and nosebleeds. Eyes: Negative for blurred vision, pain and discharge. Respiratory: Negative for shortness of breath. Cardiovascular: Negative for chest pain and leg swelling. Gastrointestinal: Negative for constipation, diarrhea, nausea and vomiting. Genitourinary: Negative for frequency. Musculoskeletal: Positive for back pain, joint pain and myalgias. Skin: Negative for itching and rash. Neurological: Positive for weakness and headaches. Psychiatric/Behavioral: Negative for depression. The patient has insomnia. The patient is not nervous/anxious. Physical Exam  Constitutional:       Appearance: Normal appearance. HENT:      Head: Normocephalic and atraumatic. Nose: Nose normal. No congestion. Musculoskeletal:         General: Tenderness, deformity and signs of injury present. Neurological:      Mental Status: He is alert and oriented to person, place, and time. Psychiatric:         Mood and Affect: Mood normal.         Behavior: Behavior normal.         Thought Content: Thought content normal.         Judgment: Judgment normal.         ASSESSMENT and PLAN  Diagnoses and all orders for this visit:    1. Stenosis of cervical spine with myelopathy (HCC)  -     indomethacin (INDOCIN) 50 mg capsule; Take 1 Cap by mouth nightly as needed for Gout or Pain for up to 30 days. -     acetaminophen-codeine (Tylenol-Codeine #3) 300-30 mg per tablet; Take 1 Tab by mouth daily as needed for Pain for up to 30 days. -     naloxone (NARCAN) 4 mg/actuation nasal spray; Use 1 spray intranasally, then discard. Repeat with new spray every 2 min as needed for opioid overdose symptoms, alternating nostrils. 2. Multiple sclerosis (HCC)  -     indomethacin (INDOCIN) 50 mg capsule; Take 1 Cap by mouth nightly as needed for Gout or Pain for up to 30 days.   -     acetaminophen-codeine (Tylenol-Codeine #3) 300-30 mg per tablet; Take 1 Tab by mouth daily as needed for Pain for up to 30 days. -     naloxone (NARCAN) 4 mg/actuation nasal spray; Use 1 spray intranasally, then discard. Repeat with new spray every 2 min as needed for opioid overdose symptoms, alternating nostrils. 3. Fall (on) (from) other stairs and steps, initial encounter  -     indomethacin (INDOCIN) 50 mg capsule; Take 1 Cap by mouth nightly as needed for Gout or Pain for up to 30 days. -     acetaminophen-codeine (Tylenol-Codeine #3) 300-30 mg per tablet; Take 1 Tab by mouth daily as needed for Pain for up to 30 days. -     naloxone (NARCAN) 4 mg/actuation nasal spray; Use 1 spray intranasally, then discard. Repeat with new spray every 2 min as needed for opioid overdose symptoms, alternating nostrils. 4. Acute pain of right hip  -     indomethacin (INDOCIN) 50 mg capsule; Take 1 Cap by mouth nightly as needed for Gout or Pain for up to 30 days. -     acetaminophen-codeine (Tylenol-Codeine #3) 300-30 mg per tablet; Take 1 Tab by mouth daily as needed for Pain for up to 30 days. -     naloxone (NARCAN) 4 mg/actuation nasal spray; Use 1 spray intranasally, then discard. Repeat with new spray every 2 min as needed for opioid overdose symptoms, alternating nostrils. Other orders  -     fexofenadine (ALLEGRA) 180 mg tablet; Take 1 Tab by mouth daily as needed for Allergies.       Concern abdout COVID-19 addressed and detailed, pt was told that the best way to prevent illness is to avoid being exposed to this virus, by clean hands often, use a hand  that contains at least 60% alcohol, Avoid touching your eyes, nose, and mouth with unwashed hand, Avoid close contact with people who are sick , Put distance between yourself and other people, Stay home if you are sick, except to get medical care, Cover your mouth and nose, Throw used tissues in the trash, Wear a facemask if you are sick,     Pursuant to the emergency declaration under the [address] and [employer], [employer] waiver authority and the [employer] and [employer] Act, this Virtual Visit was conducted, with patient's consent, to reduce the patient's risk of exposure to COVID-19 and provide continuity of care for an established patient. Pt was also told if develop dyspnea needs to call 911 or go to er, call for silvestre advise, pt agreed with todays recommendations,     Services were provided through a Video synchronous discussion virtually to substitute for in-person appointment.

## 2020-10-12 ENCOUNTER — TELEPHONE (OUTPATIENT)
Dept: FAMILY MEDICINE CLINIC | Age: 50
End: 2020-10-12

## 2020-10-12 NOTE — TELEPHONE ENCOUNTER
Returned call to pt, requesting appt for right knee cut,  Swelling and warm to touch,  appt scheduled for [email or web address]

## 2020-10-12 NOTE — TELEPHONE ENCOUNTER
Patient called and left message stating his knee has an infection and is hot to touch- asking for an antibiotic to be called in. Would also like nurse/provider to given him a call back at [phone].      Thanks,  Corrina Alves

## 2020-10-13 ENCOUNTER — VIRTUAL VISIT (OUTPATIENT)
Dept: FAMILY MEDICINE CLINIC | Age: 50
End: 2020-10-13
Payer: MEDICARE

## 2020-10-13 DIAGNOSIS — S80.261A INSECT BITE OF RIGHT KNEE, INITIAL ENCOUNTER: Primary | ICD-10-CM

## 2020-10-13 DIAGNOSIS — M25.561 ACUTE PAIN OF RIGHT KNEE: ICD-10-CM
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
## 2020-10-13 DIAGNOSIS — Z71.89 ADVICE GIVEN ABOUT COVID-19 VIRUS INFECTION: ICD-10-CM

## 2020-10-13 PROCEDURE — G8428 CUR MEDS NOT DOCUMENT: HCPCS | Performed by: FAMILY MEDICINE

## 2020-10-13 PROCEDURE — G8432 DEP SCR NOT DOC, RNG: HCPCS | Performed by: FAMILY MEDICINE

## 2020-10-13 PROCEDURE — 99213 OFFICE O/P EST LOW 20 MIN: CPT | Performed by: FAMILY MEDICINE

## 2020-10-13 RX ORDER — SULFAMETHOXAZOLE AND TRIMETHOPRIM 800; 160 MG/1; MG/1
1 TABLET ORAL 2 TIMES DAILY
Qty: 20 TAB | Refills: 0 | Status: SHIPPED | OUTPATIENT
Start: 2020-10-13 | End: 2020-10-23

## 2020-10-13 RX ORDER — KETOROLAC TROMETHAMINE 10 MG/1
10 TABLET, FILM COATED ORAL
Qty: 21 TAB | Refills: 0 | Status: SHIPPED | OUTPATIENT
Start: 2020-10-13 | End: 2020-10-20

## 2020-10-13 NOTE — PROGRESS NOTES
HISTORY OF PRESENT ILLNESS  Marcy Mclean is a 52 y.o. male. HPI   Pt's main concerns were provided on virtual visit, a telemed format,  pt is w/ comorbid medical history and unaware of been exposed to covid-19 individual, Pt Have been staying at home for couple of wks,  pt has no fever no cough no dyspnea, no ha, not dizzy, nl smell nl taste, no N/V/D, no body ache. Knee pain with the Rash on the rt knee,  was crawling in the dirt,   Started few days ago not better, the patient has tried alcohol washing and OTC antibiotic ointments,  no family member have the same problem, it does tingles and it is pain full, states that is expanding red, and is swelled up, like a lump, The history is provided by the patient. the pain intensity has changed and worsening since onset. Patient states that it is a dull nonradiating no numbness no tingling sensation disabling, morning stiffness which gets better with activity and with the severity of 8/10. in addition stating that current pain is also aggravated by movement and palpation. So for pt denies any history of extremity trauma,      Current Outpatient Medications   Medication Sig Dispense Refill    pregabalin (LYRICA) 300 mg capsule Take 300 mg by mouth two (2) times a day.  naloxone (NARCAN) 4 mg/actuation nasal spray Use 1 spray intranasally, then discard. Repeat with new spray every 2 min as needed for opioid overdose symptoms, alternating nostrils. (Patient not taking: Reported on 10/13/2020) 2 Each 1    fexofenadine (ALLEGRA) 180 mg tablet Take 1 Tab by mouth daily as needed for Allergies. (Patient not taking: Reported on 10/13/2020) 30 Tab 2    diclofenac (VOLTAREN) 1 % gel Apply 4 g to affected area four (4) times daily. (Patient not taking: Reported on 10/13/2020) 100 g 1    nicotine 10 mg/mL spry 1 Act by Nasal route two (2) times daily as needed (prn).  (Patient not taking: Reported on 10/13/2020) 10 mL 3    clobetasol (TEMOVATE) 0.05 % topical cream Apply  to affected area two (2) times a day. (Patient not taking: Reported on 10/13/2020) 60 g 2    albuterol (VENTOLIN HFA) 90 mcg/actuation inhaler INHALE 1 PUFF BY MOUTH EVERY 4 HOURS AS NEEDED FOR WHEEZING (Patient not taking: Reported on 10/13/2020) 18 g 12    nystatin (MYCOSTATIN) topical cream Apply  to affected area two (2) times a day. (Patient not taking: Reported on 10/13/2020) 30 g 2    guaiFENesin ER (MUCINEX) 600 mg ER tablet Take 1 Tab by mouth two (2) times a day. (Patient not taking: Reported on 10/13/2020) 15 Tab 0    rosuvastatin (CRESTOR) 10 mg tablet Take 1 Tab by mouth nightly. (Patient not taking: Reported on 10/13/2020) 30 Tab 6    aspirin delayed-release 81 mg tablet Take 1 Tab by mouth daily. (Patient not taking: Reported on 10/13/2020) 30 Tab 11    DULoxetine (CYMBALTA) 30 mg capsule Take 30 mg by mouth two (2) times a day.  tamsulosin (FLOMAX) 0.4 mg capsule Take 0.4 mg by mouth daily.  pantoprazole (PROTONIX) 40 mg tablet Take 1 Tab by mouth daily. (Patient not taking: Reported on 10/13/2020) 40 Tab 2    ondansetron (ZOFRAN ODT) 8 mg disintegrating tablet Take 1 Tab by mouth every eight (8) hours as needed for Nausea. (Patient not taking: Reported on 10/13/2020) 30 Tab 0     Allergies   Allergen Reactions    Penicillin G Other (comments)     \"Swelling\" at age 5yo.        Past Medical History:   Diagnosis Date    Arm pain, anterior, left 8/23/2019    Arthritis     Chronic pain     GERD (gastroesophageal reflux disease)     Stenosis of cervical spine with myelopathy (HCC) 3/24/2016     Past Surgical History:   Procedure Laterality Date    HX ORTHOPAEDIC      Spinal Fusion    HX TONSILLECTOMY       Family History   Problem Relation Age of Onset    Heart defect Mother         IRREGULAR HEARTBEAT    Other Brother         PERIPHERAL ARTERY DISEASE    Anesth Problems Neg Hx      Social History     Tobacco Use    Smoking status: Current Every Day Smoker Packs/day: 1.00     Years: 20.00     Pack years: 20.00    Smokeless tobacco: Never Used   Substance Use Topics    Alcohol use: Yes     Comment: MONTHLY      Lab Results   Component Value Date/Time    WBC 8.4 01/22/2019 03:09 PM    HGB 14.6 01/22/2019 03:09 PM    HCT 42.8 01/22/2019 03:09 PM    PLATELET 158 07/71/2049 03:09 PM    MCV 90 01/22/2019 03:09 PM     Lab Results   Component Value Date/Time    TSH 4.560 (H) 01/22/2019 03:09 PM         Review of Systems   Constitutional: Negative for chills, fever and malaise/fatigue. HENT: Negative for nosebleeds. Eyes: Negative for pain. Respiratory: Negative for cough and wheezing. Cardiovascular: Negative for chest pain and leg swelling. Gastrointestinal: Negative for constipation, diarrhea and nausea. Genitourinary: Negative for frequency. Musculoskeletal: Positive for joint pain and myalgias. Skin: Negative for rash. Neurological: Negative for loss of consciousness and headaches. Endo/Heme/Allergies: Does not bruise/bleed easily. Psychiatric/Behavioral: Negative for depression. The patient is not nervous/anxious and does not have insomnia. All other systems reviewed and are negative. Physical Exam  Constitutional:       Appearance: Normal appearance. HENT:      Head: Normocephalic and atraumatic. Nose: Nose normal. No congestion. Musculoskeletal:         General: Tenderness present. Neurological:      Mental Status: He is alert and oriented to person, place, and time. Psychiatric:         Mood and Affect: Mood normal.         Behavior: Behavior normal.         Thought Content: Thought content normal.         Judgment: Judgment normal.         ASSESSMENT and PLAN  Diagnoses and all orders for this visit:    1. Insect bite of right knee, initial encounter    2. Advice given about COVID-19 virus infection    3.  Acute pain of right knee    Other orders  -     trimethoprim-sulfamethoxazole (Bactrim DS) 160-800 mg per tablet; Take 1 Tab by mouth two (2) times a day for 10 days. -     ketorolac (TORADOL) 10 mg tablet; Take 1 Tab by mouth three (3) times daily as needed for Pain for up to 7 days. Will start on Abx high dose for the next 14 days, advise to wash bid with soaps and water, multiple hand washing, medications side effects was addressed, was told to RTC or call if not better  Patient advised to have the mask on most of the time, social distance and handwashing avoid crowded area pursuant to the emergency declaration under the [address] and [place] [address] waiver authority and the [employer] and [employer] Act, this Virtual Visit was conducted, with patient's consent, to reduce the patient's risk of exposure to COVID-19 and provide continuity of care for an established patient  Services were provided through a Video synchronous discussion virtually to substitute for in-person appointment.

## 2020-10-13 NOTE — PROGRESS NOTES
Chief Complaint   Patient presents with    Knee Injury     right knee cut      1. Have you been to the ER, urgent care clinic since your last visit? Hospitalized since your last visit? No    2. Have you seen or consulted any other health care providers outside of the [address] [place] [address] since your last visit? Include any pap smears or colon screening. No    Call placed to pt. Verified patient with two type of identifiers. C/o cut to  right knee, unsure of where he injured. Swelling and warm to touch from knee to ankle.

## 2021-03-02 DIAGNOSIS — J45.20 MILD INTERMITTENT ASTHMA WITHOUT COMPLICATION: ICD-10-CM

## 2021-03-02 DIAGNOSIS — Z72.0 TOBACCO ABUSE DISORDER: ICD-10-CM

## 2021-03-04 RX ORDER — ALBUTEROL SULFATE 90 UG/1
AEROSOL, METERED RESPIRATORY (INHALATION)
Qty: 18 G | Refills: 12 | Status: SHIPPED | OUTPATIENT
Start: 2021-03-04

## 2021-12-16 NOTE — PROGRESS NOTES
HISTORY OF PRESENT ILLNESS Annabel Davila is a 50 y.o. male. HPI Patient presents for follow-up with right lower extremity leg swelling last visit patient was told to get compression stocking applied to the right lower extremity during the day and off at night today patient presents stating that recommendation worked in the right lower extremity swelling has gone away at this time Patient also with multiple sclerosis spinal cord deformity for which made him to become bedridden for so long patient has been fighter splint fighting his condition today he is able to walk with a limp without any mobility device the pain is improved stating that he did lose more weight so that surgical procedure would be preferable although he by the surgeon at [facility] as he states, This visit started on psychostimulant depressed appetite is increased body without patient has been compliant with stating that he did very well close to 10 pounds for which he is very happy requesting a refill regarding his medication stating spite all of her side effect this medication has he has to continue to lose weight otherwise he was told that he is not really get better Regardless his weight dropped from 251-239 pounds today his body mass index still high at 32.4 The pt is feeling very good on this meds, feeling less tired and fatigued, becoming to be more concentrated at work and at home, getting along very well with family member and the work place, in addition the pt is becoming to be more active and having daily multiple healthy different diets,  Is more involved with the weekly routine exercises, not a fast fooder, states that the pt does not eat too much as used to do and the portion are very well controlled, likes very much to continue on this medication despite knowing that it is not a safe meds, and  needs to be monitored q3 months and if any thing of unusual, the pt was told if any needs to call us and let us know of any  concern regarding the current meds, hopefully has not had any concern so far,  
 
 
Current Outpatient Medications Medication Sig Dispense Refill  rosuvastatin (CRESTOR) 10 mg tablet Take 1 Tab by mouth nightly. 30 Tab 6  [START ON 2/21/2019] phentermine (ADIPEX-P) 37.5 mg tablet Take 1 Tab by mouth every morning. Max Daily Amount: 37.5 mg. 30 Tab 0  
 aspirin delayed-release 81 mg tablet Take 1 Tab by mouth daily. 30 Tab 11  
 [START ON 3/21/2019] phentermine (ADIPEX-P) 37.5 mg tablet Take 1 Tab by mouth every morning. Max Daily Amount: 37.5 mg. 30 Tab 0  
 [START ON 4/21/2019] phentermine (ADIPEX-P) 37.5 mg tablet Take 1 Tab by mouth every morning. Max Daily Amount: 37.5 mg. 30 Tab 0  
 DULoxetine (CYMBALTA) 30 mg capsule Take 30 mg by mouth two (2) times a day.  pregabalin (LYRICA) 300 mg capsule Take 300 mg by mouth two (2) times a day.  tamsulosin (FLOMAX) 0.4 mg capsule Take 0.4 mg by mouth daily.  baclofen (LIORESAL) 10 mg tablet Take  by mouth three (3) times daily as needed.  phentermine (ADIPEX-P) 37.5 mg tablet Take 1 Tab by mouth every morning. Max Daily Amount: 37.5 mg. 30 Tab 0  
 pantoprazole (PROTONIX) 40 mg tablet Take 1 Tab by mouth daily. 40 Tab 2  cyclobenzaprine (FLEXERIL) 5 mg tablet Take 5 mg by mouth two (2) times daily as needed.  oxyCODONE IR (ROXICODONE) 5 mg immediate release tablet Take 1-2 Tabs by mouth every three (3) hours as needed. Max Daily Amount: 80 mg. 70 Tab 0  
 ondansetron (ZOFRAN ODT) 8 mg disintegrating tablet Take 1 Tab by mouth every eight (8) hours as needed for Nausea. 30 Tab 0 Allergies Allergen Reactions  Penicillin G Other (comments) \"Swelling\" at age 5yo. Past Medical History:  
Diagnosis Date  Arthritis  Chronic pain  GERD (gastroesophageal reflux disease)  Stenosis of cervical spine with myelopathy 3/24/2016 Past Surgical History:  
Procedure Laterality Date  HX ORTHOPAEDIC Spinal Fusion  HX TONSILLECTOMY Family History Problem Relation Age of Onset  Heart defect Mother IRREGULAR HEARTBEAT  
 Other Brother PERIPHERAL ARTERY DISEASE  Anesth Problems Neg Hx Social History Tobacco Use  Smoking status: Current Every Day Smoker Packs/day: 1.00 Years: 20.00 Pack years: 20.00  Smokeless tobacco: Never Used Substance Use Topics  Alcohol use: Yes Comment: MONTHLY Lab Results Component Value Date/Time WBC 8.4 01/22/2019 03:09 PM  
 HGB 14.6 01/22/2019 03:09 PM  
 HCT 42.8 01/22/2019 03:09 PM  
 PLATELET 247 36/71/7840 03:09 PM  
 MCV 90 01/22/2019 03:09 PM  
 
Lab Results Component Value Date/Time TSH 4.560 (H) 01/22/2019 03:09 PM  
   
Review of Systems Constitutional: Positive for malaise/fatigue. Negative for chills and fever. HENT: Negative for nosebleeds. Eyes: Negative for pain. Respiratory: Negative for cough and wheezing. Cardiovascular: Negative for chest pain and leg swelling. Gastrointestinal: Negative for constipation, diarrhea and nausea. Genitourinary: Negative for frequency. Musculoskeletal: Negative for joint pain and myalgias. Skin: Negative for rash. Neurological: Positive for headaches. Negative for loss of consciousness. Endo/Heme/Allergies: Does not bruise/bleed easily. Psychiatric/Behavioral: Negative for depression. The patient is not nervous/anxious and does not have insomnia. All other systems reviewed and are negative. Physical Exam  
Constitutional: He is oriented to person, place, and time. He appears well-developed and well-nourished. HENT:  
Head: Normocephalic and atraumatic. Mouth/Throat: No oropharyngeal exudate. Eyes: Conjunctivae and EOM are normal.  
Neck: Normal range of motion. Neck supple. Cardiovascular: Normal rate, regular rhythm and normal heart sounds. No murmur heard. Pulmonary/Chest: Effort normal and breath sounds normal. No respiratory distress. Abdominal: Soft. Bowel sounds are normal. He exhibits no distension. There is no rebound. Musculoskeletal: He exhibits no edema or tenderness. Neurological: He is alert and oriented to person, place, and time. Skin: Skin is warm. No erythema. Psychiatric: He has a normal mood and affect. His behavior is normal.  
Nursing note and vitals reviewed. ASSESSMENT and PLAN Diagnoses and all orders for this visit: 
 
1. Multiple sclerosis (HCC) 
-     rosuvastatin (CRESTOR) 10 mg tablet; Take 1 Tab by mouth nightly. -     phentermine (ADIPEX-P) 37.5 mg tablet; Take 1 Tab by mouth every morning. Max Daily Amount: 37.5 mg. 
-     aspirin delayed-release 81 mg tablet; Take 1 Tab by mouth daily. -     phentermine (ADIPEX-P) 37.5 mg tablet; Take 1 Tab by mouth every morning. Max Daily Amount: 37.5 mg. 
-     phentermine (ADIPEX-P) 37.5 mg tablet; Take 1 Tab by mouth every morning. Max Daily Amount: 37.5 mg. 
 
2. Spinal cord lesion (HCC) 
-     rosuvastatin (CRESTOR) 10 mg tablet; Take 1 Tab by mouth nightly. -     phentermine (ADIPEX-P) 37.5 mg tablet; Take 1 Tab by mouth every morning. Max Daily Amount: 37.5 mg. 
-     aspirin delayed-release 81 mg tablet; Take 1 Tab by mouth daily. -     phentermine (ADIPEX-P) 37.5 mg tablet; Take 1 Tab by mouth every morning. Max Daily Amount: 37.5 mg. 
-     phentermine (ADIPEX-P) 37.5 mg tablet; Take 1 Tab by mouth every morning. Max Daily Amount: 37.5 mg. 
 
3. Stenosis of cervical spine with myelopathy 
-     rosuvastatin (CRESTOR) 10 mg tablet; Take 1 Tab by mouth nightly. -     phentermine (ADIPEX-P) 37.5 mg tablet; Take 1 Tab by mouth every morning. Max Daily Amount: 37.5 mg. 
-     aspirin delayed-release 81 mg tablet; Take 1 Tab by mouth daily. -     phentermine (ADIPEX-P) 37.5 mg tablet; Take 1 Tab by mouth every morning.  Max Daily Amount: 37.5 mg. 
 -     phentermine (ADIPEX-P) 37.5 mg tablet; Take 1 Tab by mouth every morning. Max Daily Amount: 37.5 mg. 
 
4. BMI 32.0-32.9,adult 
-     rosuvastatin (CRESTOR) 10 mg tablet; Take 1 Tab by mouth nightly. -     phentermine (ADIPEX-P) 37.5 mg tablet; Take 1 Tab by mouth every morning. Max Daily Amount: 37.5 mg. 
-     aspirin delayed-release 81 mg tablet; Take 1 Tab by mouth daily. -     phentermine (ADIPEX-P) 37.5 mg tablet; Take 1 Tab by mouth every morning. Max Daily Amount: 37.5 mg. 
-     phentermine (ADIPEX-P) 37.5 mg tablet; Take 1 Tab by mouth every morning. Max Daily Amount: 37.5 mg. 
 
5. Screening for alcoholism -     [place] ANNUAL ALCOHOL SCREEN 15 MIN 6. Screening for depression 
-     [place] [address] 7. Encounter for immunization -     ADMIN INFLUENZA VIRUS VAC 
-     INFLUENZA VIRUS VAC QUAD,SPLIT,PRESV FREE SYRINGE IM 
-     ADMIN PNEUMOCOCCAL VACCINE 
-     TETANUS, DIPHTHERIA TOXOIDS AND ACELLULAR PERTUSSIS VACCINE (TDAP), IN INDIVIDS. >=7, IM Discussed the patient's BMI with him. The BMI follow up plan is as follows:  
 
dietary management education, guidance, and counseling 
encourage exercise 
monitor weight 
prescribed dietary intake An After Visit Summary was printed and given to the patient. This is an Initial Medicare Annual Wellness Exam (AWV) (Performed 12 months after IPPE or effective date of Medicare Part B enrollment, Once in a lifetime) I have reviewed the patient's medical history in detail and updated the computerized patient record. History 50 y.o. male for annual physical exam.  
Last wellness exam was few years ago Not Up todate w/ all vaccination, last tetanus vaccine was unknown, regardless he was offered all the recommended vaccination as preventive measure but patient has been declining all so far, patient has had hx of fall secondary to multiple sclerosis not feeling depressed, no blood in the stool no tarry stool, still driving at this time, Medications causing fall and the risk for future fall detailed for him today the depression at this age addressed pt with improvig interests and enjoy to do things, not anxious not depressed,  pt at this visit, is physically functional with gait which is abnormal and nicely independent and walks w/out mobility device with deformity and with a limp and, but mentaly is very functional,  very Alert and oriented, unfortunately,  BMI for his age is into obesity state  
the  abnl BMI r/w'd and information given, bp was screened for abnormality slightly low but overall not bad for his age,, 
    .  
last colonoscopy was never, No family hx of breast cancer in a male Last psa exam was normal and was in a few years ago,  
 
no family hx of colon cancer, patient is stating that he is trying to be sexaully active,  ++physically active, 
compliant w/ meds, ++Rf needed for today for his meds. Past Medical History:  
Diagnosis Date  Arthritis  Chronic pain  GERD (gastroesophageal reflux disease)  Stenosis of cervical spine with myelopathy 3/24/2016 Past Surgical History:  
Procedure Laterality Date  HX ORTHOPAEDIC Spinal Fusion  HX TONSILLECTOMY Current Outpatient Medications Medication Sig Dispense Refill  rosuvastatin (CRESTOR) 10 mg tablet Take 1 Tab by mouth nightly. 30 Tab 6  [START ON 2/21/2019] phentermine (ADIPEX-P) 37.5 mg tablet Take 1 Tab by mouth every morning. Max Daily Amount: 37.5 mg. 30 Tab 0  
 aspirin delayed-release 81 mg tablet Take 1 Tab by mouth daily. 30 Tab 11  
 [START ON 3/21/2019] phentermine (ADIPEX-P) 37.5 mg tablet Take 1 Tab by mouth every morning. Max Daily Amount: 37.5 mg. 30 Tab 0  
 [START ON 4/21/2019] phentermine (ADIPEX-P) 37.5 mg tablet Take 1 Tab by mouth every morning. Max Daily Amount: 37.5 mg. 30 Tab 0  
 DULoxetine (CYMBALTA) 30 mg capsule Take 30 mg by mouth two (2) times a day.  pregabalin (LYRICA) 300 mg capsule Take 300 mg by mouth two (2) times a day.  tamsulosin (FLOMAX) 0.4 mg capsule Take 0.4 mg by mouth daily.  baclofen (LIORESAL) 10 mg tablet Take  by mouth three (3) times daily as needed.  phentermine (ADIPEX-P) 37.5 mg tablet Take 1 Tab by mouth every morning. Max Daily Amount: 37.5 mg. 30 Tab 0  
 pantoprazole (PROTONIX) 40 mg tablet Take 1 Tab by mouth daily. 40 Tab 2  cyclobenzaprine (FLEXERIL) 5 mg tablet Take 5 mg by mouth two (2) times daily as needed.  oxyCODONE IR (ROXICODONE) 5 mg immediate release tablet Take 1-2 Tabs by mouth every three (3) hours as needed. Max Daily Amount: 80 mg. 70 Tab 0  
 ondansetron (ZOFRAN ODT) 8 mg disintegrating tablet Take 1 Tab by mouth every eight (8) hours as needed for Nausea. 30 Tab 0 Allergies Allergen Reactions  Penicillin G Other (comments) \"Swelling\" at age 5yo. Family History Problem Relation Age of Onset  Heart defect Mother IRREGULAR HEARTBEAT  
 Other Brother PERIPHERAL ARTERY DISEASE  Anesth Problems Neg Hx Social History Tobacco Use  Smoking status: Current Every Day Smoker Packs/day: 1.00 Years: 20.00 Pack years: 20.00  Smokeless tobacco: Never Used Substance Use Topics  Alcohol use: Yes Comment: MONTHLY Patient Active Problem List  
Diagnosis Code  Stenosis of cervical spine with myelopathy M47.12 Depression Risk Factor Screening: PHQ over the last two weeks 2/5/2019 Little interest or pleasure in doing things Not at all Feeling down, depressed, irritable, or hopeless Not at all Total Score PHQ 2 0 Alcohol Risk Factor Screening: You do not drink alcohol or very rarely. Functional Ability and Level of Safety:  
 
Hearing Loss Hearing is good. Activities of Daily Living The home contains: handrails, grab bars and rugs Patient does total self care Fall Risk No flowsheet data found. Abuse Screen Patient is not abused Cognitive Screening Evaluation of Cognitive Function: 
Has your family/caregiver stated any concerns about your memory: no 
Normal 
 
Patient Care Team  
Patient Care Team: 
Kamilla Chacon MD as PCP - General (Family Practice) Assessment/Plan Education and counseling provided: 
Are appropriate based on today's review and evaluation End-of-Life planning (with patient's consent) Pneumococcal Vaccine Influenza Vaccine Diagnoses and all orders for this visit: 
 
1. Multiple sclerosis (HCC) 
-     rosuvastatin (CRESTOR) 10 mg tablet; Take 1 Tab by mouth nightly. -     phentermine (ADIPEX-P) 37.5 mg tablet; Take 1 Tab by mouth every morning. Max Daily Amount: 37.5 mg. 
-     aspirin delayed-release 81 mg tablet; Take 1 Tab by mouth daily. -     phentermine (ADIPEX-P) 37.5 mg tablet; Take 1 Tab by mouth every morning. Max Daily Amount: 37.5 mg. 
-     phentermine (ADIPEX-P) 37.5 mg tablet; Take 1 Tab by mouth every morning. Max Daily Amount: 37.5 mg. 
 
2. Spinal cord lesion (HCC) 
-     rosuvastatin (CRESTOR) 10 mg tablet; Take 1 Tab by mouth nightly. -     phentermine (ADIPEX-P) 37.5 mg tablet; Take 1 Tab by mouth every morning. Max Daily Amount: 37.5 mg. 
-     aspirin delayed-release 81 mg tablet; Take 1 Tab by mouth daily. -     phentermine (ADIPEX-P) 37.5 mg tablet; Take 1 Tab by mouth every morning. Max Daily Amount: 37.5 mg. 
-     phentermine (ADIPEX-P) 37.5 mg tablet; Take 1 Tab by mouth every morning. Max Daily Amount: 37.5 mg. 
 
3. Stenosis of cervical spine with myelopathy 
-     rosuvastatin (CRESTOR) 10 mg tablet; Take 1 Tab by mouth nightly. -     phentermine (ADIPEX-P) 37.5 mg tablet; Take 1 Tab by mouth every morning. Max Daily Amount: 37.5 mg. 
-     aspirin delayed-release 81 mg tablet; Take 1 Tab by mouth daily. -     phentermine (ADIPEX-P) 37.5 mg tablet; Take 1 Tab by mouth every morning.  Max Daily Amount: 37.5 mg. 
 -     phentermine (ADIPEX-P) 37.5 mg tablet; Take 1 Tab by mouth every morning. Max Daily Amount: 37.5 mg. 
 
4. BMI 32.0-32.9,adult 
-     rosuvastatin (CRESTOR) 10 mg tablet; Take 1 Tab by mouth nightly. -     phentermine (ADIPEX-P) 37.5 mg tablet; Take 1 Tab by mouth every morning. Max Daily Amount: 37.5 mg. 
-     aspirin delayed-release 81 mg tablet; Take 1 Tab by mouth daily. -     phentermine (ADIPEX-P) 37.5 mg tablet; Take 1 Tab by mouth every morning. Max Daily Amount: 37.5 mg. 
-     phentermine (ADIPEX-P) 37.5 mg tablet; Take 1 Tab by mouth every morning. Max Daily Amount: 37.5 mg. 
 
5. Screening for alcoholism -     [place] ANNUAL ALCOHOL SCREEN 15 MIN 6. Screening for depression 
-     [place] [address] 7. Encounter for immunization -     ADMIN INFLUENZA VIRUS VAC 
-     INFLUENZA VIRUS VAC QUAD,SPLIT,PRESV FREE SYRINGE IM 
-     ADMIN PNEUMOCOCCAL VACCINE 
-     TETANUS, DIPHTHERIA TOXOIDS AND ACELLULAR PERTUSSIS VACCINE (TDAP), IN INDIVIDS. >=7, IM 
 
  
 
[facility] education and counseling provided: 
Age appropriate evidence-based preventive care recommendations based on today's review and evaluation; including relevant cancer screening guidelines, and vaccination recommendations. An After Visit Summary was printed and given to the patient with information about these guidelines, and a personalized schedule for health maintenance items. When appropriate and with patient agreement, orders noted below were placed to complete missing health maintenance items. Health Maintenance Due Topic Date Due  Pneumococcal 19-64 Medium Risk (1 of 1 - PPSV23) 10/30/1989  
 DTaP/Tdap/Td series (1 - Tdap) 10/30/1991  MEDICARE YEARLY EXAM  12/27/2018 Detail Level: Detailed Render Note In Bullet Format When Appropriate: No Post-Care Instructions: I reviewed with the patient in detail post-care instructions. Patient is to wear sunprotection, and avoid picking at any of the treated lesions. Pt may apply Vaseline to crusted or scabbing areas. Number Of Freeze-Thaw Cycles: 3 freeze-thaw cycles Show Aperture Variable?: Yes Duration Of Freeze Thaw-Cycle (Seconds): 0 Consent: The patient's consent was obtained including but not limited to risks of crusting, scabbing, blistering, scarring, darker or lighter pigmentary change, recurrence, incomplete removal and infection.

## 2022-03-19 PROBLEM — M79.602 ARM PAIN, ANTERIOR, LEFT: Status: ACTIVE | Noted: 2019-08-23

## 2022-04-27 ENCOUNTER — OFFICE VISIT (OUTPATIENT)
Dept: FAMILY MEDICINE CLINIC | Age: 52
End: 2022-04-27
Payer: MEDICARE

## 2022-04-27 VITALS
RESPIRATION RATE: 15 BRPM | SYSTOLIC BLOOD PRESSURE: 152 MMHG | BODY MASS INDEX: 28.72 KG/M2 | HEIGHT: 73 IN | WEIGHT: 216.7 LBS | DIASTOLIC BLOOD PRESSURE: 85 MMHG | HEART RATE: 92 BPM | OXYGEN SATURATION: 95 % | TEMPERATURE: 99.2 F

## 2022-04-27 DIAGNOSIS — M79.605 PAIN OF LEFT LOWER EXTREMITY: ICD-10-CM

## 2022-04-27 DIAGNOSIS — R53.83 LOW ENERGY: ICD-10-CM

## 2022-04-27 DIAGNOSIS — M79.604 PAIN OF RIGHT LOWER EXTREMITY: Primary | ICD-10-CM

## 2022-04-27 DIAGNOSIS — K08.9 POOR DENTITION: ICD-10-CM

## 2022-04-27 PROCEDURE — G8419 CALC BMI OUT NRM PARAM NOF/U: HCPCS | Performed by: FAMILY MEDICINE

## 2022-04-27 PROCEDURE — 3017F COLORECTAL CA SCREEN DOC REV: CPT | Performed by: FAMILY MEDICINE

## 2022-04-27 PROCEDURE — 99214 OFFICE O/P EST MOD 30 MIN: CPT | Performed by: FAMILY MEDICINE

## 2022-04-27 PROCEDURE — G0463 HOSPITAL OUTPT CLINIC VISIT: HCPCS | Performed by: FAMILY MEDICINE

## 2022-04-27 PROCEDURE — G8427 DOCREV CUR MEDS BY ELIG CLIN: HCPCS | Performed by: FAMILY MEDICINE

## 2022-04-27 PROCEDURE — G8510 SCR DEP NEG, NO PLAN REQD: HCPCS | Performed by: FAMILY MEDICINE

## 2022-04-27 RX ORDER — METHYLPREDNISOLONE 4 MG/1
TABLET ORAL
Qty: 1 DOSE PACK | Refills: 0 | Status: SHIPPED | OUTPATIENT
Start: 2022-04-27

## 2022-04-27 RX ORDER — CLINDAMYCIN HYDROCHLORIDE 300 MG/1
300 CAPSULE ORAL 3 TIMES DAILY
Qty: 30 CAPSULE | Refills: 0 | Status: SHIPPED | OUTPATIENT
Start: 2022-04-27 | End: 2022-05-07

## 2022-04-27 RX ORDER — ZOSTER VACCINE RECOMBINANT, ADJUVANTED 50 MCG/0.5
0.5 KIT INTRAMUSCULAR ONCE
Qty: 0.5 ML | Refills: 0 | Status: SHIPPED | OUTPATIENT
Start: 2022-04-27 | End: 2022-04-27

## 2022-04-27 NOTE — PROGRESS NOTES
Chief Complaint   Patient presents with    Foot Pain     left     1. Have you been to the ER, urgent care clinic since your last visit? Hospitalized since your last visit? No    2. Have you seen or consulted any other health care providers outside of the [address] [place] [address] since your last visit? Include any pap smears or colon screening. No      Verified patient with two type of identifiers. c/o left foot pain x 2-3 days, unsure how it was injured.  Wound to bottom of foot, small scab to top, no drainage,  Stated feels very cold and \"bees stinging\"

## 2022-04-27 NOTE — PROGRESS NOTES
Chief Complaint   Patient presents with    Foot Pain     left    Annual Wellness Visit       1. \"Have you been to the ER, urgent care clinic since your last visit? Hospitalized since your last visit? \" No    2. \"Have you seen or consulted any other health care providers outside of the [address] [place] [address] since your last visit? \" No     3. For patients aged 39-70: Has the patient had a colonoscopy / FIT/ Cologuard? NO    If the patient is female:    4. For patients aged 41-77: Has the patient had a mammogram within the past 2 years? NA - based on age or sex      11. For patients aged 21-65: Has the patient had a pap smear?  NA - based on age or sex    Health Maintenance Due   Topic Date Due    Hepatitis C Screening  Never done    Depression Screen  Never done    COVID-19 Vaccine (1) Never done    Pneumococcal 0-64 years (1 - PCV) Never done    DTaP/Tdap/Td series (1 - Tdap) Never done    Colorectal Cancer Screening Combo  Never done    Lipid Screen  01/22/2020    Medicare Yearly Exam  02/06/2020    Shingrix Vaccine Age 50> (1 of 2) Never done    Low dose CT lung screening  Never done

## 2022-04-29 NOTE — PROGRESS NOTES
HISTORY OF PRESENT ILLNESS  Brenda Hernadez is a 46 y.o. male, present w/ ltleg pain,  The pain is mostly l>r ,  patient does a lot of daily activity patient pain  is 4 out of 10, worsening but end of the day patient is able to attend work and do job requirement, stating that sometimes the intensity of the pain is not allowing the patient to do work resting helps,  has tried over the counter pain medication and also stating that if the patient takes a lot of ibuprofen medication does create stomach upset, the lower extremity is  mostly with varicosities and tender to touch  Patient also c/o left sided jaw pain, requesting treatment patient is stating that had one of tooth with big abscess started last week has not seen the dentist awaiting the appointment the pain most is at left jaw is swelled up painful unable to chew and is not better, the pain is 7/10 throbbing pain, and is not getting better,                  Current Outpatient Medications   Medication Sig Dispense Refill    clindamycin (CLEOCIN) 300 mg capsule Take 1 Capsule by mouth three (3) times daily for 10 days. 30 Capsule 0    methylPREDNISolone (MEDROL DOSEPACK) 4 mg tablet As directed for 6 days one package 1 Dose Pack 0    DULoxetine (CYMBALTA) 30 mg capsule Take 30 mg by mouth two (2) times a day.  pregabalin (LYRICA) 300 mg capsule Take 300 mg by mouth two (2) times a day.  tamsulosin (FLOMAX) 0.4 mg capsule Take 0.4 mg by mouth daily.  albuterol (Ventolin HFA) 90 mcg/actuation inhaler INHALE 1 PUFF BY MOUTH EVERY 4 HOURS AS NEEDED FOR WHEEZING (Patient not taking: Reported on 4/27/2022) 18 g 12    naloxone (NARCAN) 4 mg/actuation nasal spray Use 1 spray intranasally, then discard. Repeat with new spray every 2 min as needed for opioid overdose symptoms, alternating nostrils. (Patient not taking: Reported on 10/13/2020) 2 Each 1    fexofenadine (ALLEGRA) 180 mg tablet Take 1 Tab by mouth daily as needed for Allergies.  (Patient not taking: Reported on 10/13/2020) 30 Tab 2    diclofenac (VOLTAREN) 1 % gel Apply 4 g to affected area four (4) times daily. (Patient not taking: Reported on 10/13/2020) 100 g 1    nicotine 10 mg/mL spry 1 Act by Nasal route two (2) times daily as needed (prn). (Patient not taking: Reported on 10/13/2020) 10 mL 3    clobetasol (TEMOVATE) 0.05 % topical cream Apply  to affected area two (2) times a day. (Patient not taking: Reported on 10/13/2020) 60 g 2    nystatin (MYCOSTATIN) topical cream Apply  to affected area two (2) times a day. (Patient not taking: Reported on 10/13/2020) 30 g 2    guaiFENesin ER (MUCINEX) 600 mg ER tablet Take 1 Tab by mouth two (2) times a day. (Patient not taking: Reported on 10/13/2020) 15 Tab 0    rosuvastatin (CRESTOR) 10 mg tablet Take 1 Tab by mouth nightly. (Patient not taking: Reported on 10/13/2020) 30 Tab 6    aspirin delayed-release 81 mg tablet Take 1 Tab by mouth daily. (Patient not taking: Reported on 10/13/2020) 30 Tab 11    pantoprazole (PROTONIX) 40 mg tablet Take 1 Tab by mouth daily. (Patient not taking: Reported on 10/13/2020) 40 Tab 2    ondansetron (ZOFRAN ODT) 8 mg disintegrating tablet Take 1 Tab by mouth every eight (8) hours as needed for Nausea. (Patient not taking: Reported on 10/13/2020) 30 Tab 0     Allergies   Allergen Reactions    Penicillin G Other (comments)     \"Swelling\" at age 3yo.        Past Medical History:   Diagnosis Date    Arm pain, anterior, left 8/23/2019    Arthritis     Chronic pain     GERD (gastroesophageal reflux disease)     Stenosis of cervical spine with myelopathy ([facility] [address] 3/24/2016     Past Surgical History:   Procedure Laterality Date    HX ORTHOPAEDIC      Spinal Fusion    HX TONSILLECTOMY       Family History   Problem Relation Age of Onset    Heart defect Mother         IRREGULAR HEARTBEAT    Other Brother         PERIPHERAL ARTERY DISEASE    Anesth Problems Neg Hx      Social History     Tobacco Use    Smoking status: Current Every Day Smoker     Packs/day: 1.00     Years: 20.00     Pack years: 20.00    Smokeless tobacco: Never Used   Substance Use Topics    Alcohol use: Yes     Comment: MONTHLY      Lab Results   Component Value Date/Time    Hemoglobin A1c 5.0 03/16/2016 09:33 AM    Glucose 98 01/22/2019 03:09 PM    Creatinine 0.99 01/22/2019 03:09 PM      Lab Results   Component Value Date/Time    Cholesterol, total 179 01/22/2019 03:09 PM    HDL Cholesterol 30 (L) 01/22/2019 03:09 PM        Review of Systems   Constitutional: Negative for chills and fever. HENT: Negative for congestion and nosebleeds. Eyes: Negative for blurred vision and pain. Respiratory: Negative for cough, shortness of breath and wheezing. Cardiovascular: Negative for chest pain and leg swelling. Gastrointestinal: Negative for constipation, diarrhea, nausea and vomiting. Genitourinary: Negative for dysuria and frequency. Musculoskeletal: Positive for joint pain and myalgias. Skin: Negative for itching and rash. Neurological: Positive for focal weakness. Negative for dizziness, loss of consciousness and headaches. Psychiatric/Behavioral: Negative for depression. The patient is not nervous/anxious and does not have insomnia. Physical Exam  Vitals and nursing note reviewed. Constitutional:       Appearance: He is well-developed. HENT:      Head: Normocephalic and atraumatic. Mouth/Throat:      Pharynx: No oropharyngeal exudate. Eyes:      Conjunctiva/sclera: Conjunctivae normal.      Pupils: Pupils are equal, round, and reactive to light. Neck:      Thyroid: No thyromegaly. Vascular: No JVD. Cardiovascular:      Rate and Rhythm: Normal rate and regular rhythm. Heart sounds: Normal heart sounds. No murmur heard. No friction rub. Pulmonary:      Effort: Pulmonary effort is normal. No respiratory distress. Breath sounds: Normal breath sounds. No wheezing or rales.    Abdominal:      General: Bowel sounds are normal. There is no distension. Palpations: Abdomen is soft. Tenderness: There is no abdominal tenderness. Musculoskeletal:         General: Tenderness, deformity and signs of injury present. Cervical back: Normal range of motion and neck supple. Left lower leg: Edema present. Lymphadenopathy:      Cervical: No cervical adenopathy. Skin:     General: Skin is warm. Findings: No erythema or rash. Neurological:      Mental Status: He is alert and oriented to person, place, and time. Deep Tendon Reflexes: Reflexes are normal and symmetric. Psychiatric:         Behavior: Behavior normal.         ASSESSMENT and PLAN  Diagnoses and all orders for this visit:    1. Pain of right lower extremity  -     REFERRAL TO NEUROSURGERY    2. Pain of left lower extremity  -     REFERRAL TO NEUROSURGERY    3. Poor dentition  -     REFERRAL TO ORTHODONTICS  -     clindamycin (CLEOCIN) 300 mg capsule; Take 1 Capsule by mouth three (3) times daily for 10 days. 4. Low energy  -     REFERRAL TO ORTHODONTICS  -     clindamycin (CLEOCIN) 300 mg capsule; Take 1 Capsule by mouth three (3) times daily for 10 days. Other orders  -     varicella-zoster recombinant, PF, (Shingrix, PF,) 50 mcg/0.5 mL susr injection; 0.5 mL by IntraMUSCular route once for 1 dose. -     methylPREDNISolone (MEDROL DOSEPACK) 4 mg tablet;  As directed for 6 days one package        Patient was told if the condition does not improve patient need to go to emergency room for further care patient acknowledged and agreed with today's recommendation

## 2022-07-12 ENCOUNTER — OFFICE VISIT (OUTPATIENT)
Dept: FAMILY MEDICINE CLINIC | Age: 52
End: 2022-07-12
Payer: MEDICARE

## 2022-07-12 VITALS
SYSTOLIC BLOOD PRESSURE: 118 MMHG | DIASTOLIC BLOOD PRESSURE: 74 MMHG | HEART RATE: 64 BPM | HEIGHT: 73 IN | TEMPERATURE: 96.8 F | RESPIRATION RATE: 16 BRPM | OXYGEN SATURATION: 96 % | BODY MASS INDEX: 29.55 KG/M2 | WEIGHT: 223 LBS

## 2022-07-12 DIAGNOSIS — Z71.89 ADVICE GIVEN ABOUT COVID-19 VIRUS INFECTION: ICD-10-CM

## 2022-07-12 DIAGNOSIS — H60.333 ACUTE SWIMMER'S EAR OF BOTH SIDES: Primary | ICD-10-CM

## 2022-07-12 PROCEDURE — 3017F COLORECTAL CA SCREEN DOC REV: CPT | Performed by: FAMILY MEDICINE

## 2022-07-12 PROCEDURE — G8510 SCR DEP NEG, NO PLAN REQD: HCPCS | Performed by: FAMILY MEDICINE

## 2022-07-12 PROCEDURE — G8427 DOCREV CUR MEDS BY ELIG CLIN: HCPCS | Performed by: FAMILY MEDICINE

## 2022-07-12 PROCEDURE — G8419 CALC BMI OUT NRM PARAM NOF/U: HCPCS | Performed by: FAMILY MEDICINE

## 2022-07-12 PROCEDURE — 99213 OFFICE O/P EST LOW 20 MIN: CPT | Performed by: FAMILY MEDICINE

## 2022-07-12 RX ORDER — AZITHROMYCIN 250 MG/1
TABLET, FILM COATED ORAL
Qty: 6 TABLET | Refills: 0 | Status: SHIPPED | OUTPATIENT
Start: 2022-07-12

## 2022-07-12 RX ORDER — NEOMYCIN SULFATE, POLYMYXIN B SULFATE AND HYDROCORTISONE 10; 3.5; 1 MG/ML; MG/ML; [USP'U]/ML
3 SUSPENSION/ DROPS AURICULAR (OTIC) 4 TIMES DAILY
Qty: 10 ML | Refills: 0 | Status: SHIPPED | OUTPATIENT
Start: 2022-07-12

## 2022-07-12 NOTE — PROGRESS NOTES
HISTORY OF PRESENT ILLNESS  Ny Ramon is a 46 y.o. male, present with Ear discomfort,   who presents for evaluation of a plugged painful RT>LT ear,  has noticed the symptoms ove the last 3 day, There is no a prior history of cerumen impaction, nor hx of daily swimming nor of Ear submerging into water, or exposed to traumatic wind injury, has no discharge, the hearing decreased and patient does daily Q tips cleaning, pain is 4/10 dull, none radiating with some HA taken OTC not helping, no other complaint today, no other foreign Impaction, red and just painful to touch or laying on the pilow, not getting better    Current Outpatient Medications   Medication Sig Dispense Refill    pregabalin (LYRICA) 300 mg capsule Take 300 mg by mouth two (2) times a day.  methylPREDNISolone (MEDROL DOSEPACK) 4 mg tablet As directed for 6 days one package (Patient not taking: Reported on 7/12/2022) 1 Dose Pack 0    albuterol (Ventolin HFA) 90 mcg/actuation inhaler INHALE 1 PUFF BY MOUTH EVERY 4 HOURS AS NEEDED FOR WHEEZING (Patient not taking: Reported on 4/27/2022) 18 g 12    naloxone (NARCAN) 4 mg/actuation nasal spray Use 1 spray intranasally, then discard. Repeat with new spray every 2 min as needed for opioid overdose symptoms, alternating nostrils. (Patient not taking: Reported on 10/13/2020) 2 Each 1    fexofenadine (ALLEGRA) 180 mg tablet Take 1 Tab by mouth daily as needed for Allergies. (Patient not taking: Reported on 10/13/2020) 30 Tab 2    diclofenac (VOLTAREN) 1 % gel Apply 4 g to affected area four (4) times daily. (Patient not taking: Reported on 10/13/2020) 100 g 1    nicotine 10 mg/mL spry 1 Act by Nasal route two (2) times daily as needed (prn). (Patient not taking: Reported on 10/13/2020) 10 mL 3    clobetasol (TEMOVATE) 0.05 % topical cream Apply  to affected area two (2) times a day.  (Patient not taking: Reported on 10/13/2020) 60 g 2    nystatin (MYCOSTATIN) topical cream Apply  to affected area two (2) times a day. (Patient not taking: Reported on 10/13/2020) 30 g 2    guaiFENesin ER (MUCINEX) 600 mg ER tablet Take 1 Tab by mouth two (2) times a day. (Patient not taking: Reported on 10/13/2020) 15 Tab 0    rosuvastatin (CRESTOR) 10 mg tablet Take 1 Tab by mouth nightly. (Patient not taking: Reported on 10/13/2020) 30 Tab 6    aspirin delayed-release 81 mg tablet Take 1 Tab by mouth daily. (Patient not taking: Reported on 10/13/2020) 30 Tab 11    DULoxetine (CYMBALTA) 30 mg capsule Take 30 mg by mouth two (2) times a day. (Patient not taking: Reported on 7/12/2022)      tamsulosin (FLOMAX) 0.4 mg capsule Take 0.4 mg by mouth daily. (Patient not taking: Reported on 7/12/2022)      pantoprazole (PROTONIX) 40 mg tablet Take 1 Tab by mouth daily. (Patient not taking: Reported on 10/13/2020) 40 Tab 2    ondansetron (ZOFRAN ODT) 8 mg disintegrating tablet Take 1 Tab by mouth every eight (8) hours as needed for Nausea. (Patient not taking: Reported on 10/13/2020) 30 Tab 0     Allergies   Allergen Reactions    Penicillin G Other (comments)     \"Swelling\" at age 5yo.        Past Medical History:   Diagnosis Date    Arm pain, anterior, left 8/23/2019    Arthritis     Chronic pain     GERD (gastroesophageal reflux disease)     Stenosis of cervical spine with myelopathy ([facility]) 3/24/2016     Past Surgical History:   Procedure Laterality Date    HX ORTHOPAEDIC      Spinal Fusion    HX TONSILLECTOMY       Family History   Problem Relation Age of Onset    Heart defect Mother         IRREGULAR HEARTBEAT    Other Brother         PERIPHERAL ARTERY DISEASE    Anesth Problems Neg Hx      Social History     Tobacco Use    Smoking status: Current Every Day Smoker     Packs/day: 1.00     Years: 20.00     Pack years: 20.00    Smokeless tobacco: Never Used   Substance Use Topics    Alcohol use: Yes     Comment: MONTHLY      Lab Results   Component Value Date/Time    Hemoglobin A1c 5.0 03/16/2016 09:33 AM Glucose 98 01/22/2019 03:09 PM    Creatinine 0.99 01/22/2019 03:09 PM      Lab Results   Component Value Date/Time    Cholesterol, total 179 01/22/2019 03:09 PM    HDL Cholesterol 30 (L) 01/22/2019 03:09 PM        Review of Systems   Constitutional: Negative for chills and fever. HENT: Negative for congestion and nosebleeds. Eyes: Negative for blurred vision and pain. Respiratory: Negative for cough, shortness of breath and wheezing. Cardiovascular: Negative for chest pain and leg swelling. Gastrointestinal: Negative for constipation, diarrhea, nausea and vomiting. Genitourinary: Negative for dysuria and frequency. Musculoskeletal: Negative for joint pain and myalgias. Skin: Negative for itching and rash. Neurological: Negative for dizziness, loss of consciousness and headaches. Psychiatric/Behavioral: Negative for depression. The patient is not nervous/anxious and does not have insomnia. Physical Exam  Vitals and nursing note reviewed. Constitutional:       Appearance: He is well-developed. HENT:      Head: Normocephalic and atraumatic. Mouth/Throat:      Pharynx: No oropharyngeal exudate. Eyes:      Conjunctiva/sclera: Conjunctivae normal.      Pupils: Pupils are equal, round, and reactive to light. Neck:      Thyroid: No thyromegaly. Vascular: No JVD. Cardiovascular:      Rate and Rhythm: Normal rate and regular rhythm. Heart sounds: Normal heart sounds. No murmur heard. No friction rub. Pulmonary:      Effort: Pulmonary effort is normal. No respiratory distress. Breath sounds: Normal breath sounds. No wheezing or rales. Abdominal:      General: Bowel sounds are normal. There is no distension. Palpations: Abdomen is soft. Tenderness: There is no abdominal tenderness. Musculoskeletal:         General: No tenderness. Cervical back: Normal range of motion and neck supple. Lymphadenopathy:      Cervical: No cervical adenopathy. Skin:     General: Skin is warm. Findings: No erythema or rash. Neurological:      Mental Status: He is alert and oriented to person, place, and time. Deep Tendon Reflexes: Reflexes are normal and symmetric. Psychiatric:         Behavior: Behavior normal.         ASSESSMENT and PLAN  Diagnoses and all orders for this visit:    1. Acute swimmer's ear of both sides  -     REFERRAL TO AUDIOLOGY  -     azithromycin (ZITHROMAX) 250 mg tablet; 2 first day then one tab daily till finished  -     neomycin-polymyxin-hydrocortisone, buffered, (PEDIOTIC) 3.5-10,000-1 mg/mL-unit/mL-% otic suspension; Administer 3 Drops in left ear four (4) times daily. Indications: outer ear inflammation due to allergy or infection    2. Advice given about COVID-19 virus infection  -     REFERRAL TO AUDIOLOGY  -     azithromycin (ZITHROMAX) 250 mg tablet; 2 first day then one tab daily till finished  -     neomycin-polymyxin-hydrocortisone, buffered, (PEDIOTIC) 3.5-10,000-1 mg/mL-unit/mL-% otic suspension; Administer 3 Drops in left ear four (4) times daily.  Indications: outer ear inflammation due to allergy or infection

## 2022-07-12 NOTE — PROGRESS NOTES
Chief Complaint   Patient presents with    Sinus Infection     can not hear in both ears       1. \"Have you been to the ER, urgent care clinic since your last visit? Hospitalized since your last visit? \" No    2. \"Have you seen or consulted any other health care providers outside of the [address] [place] [address] since your last visit? \" No     3. For patients aged 39-70: Has the patient had a colonoscopy / FIT/ Cologuard? Yes - Care Gap present. Rooming MA/LPN to request most recent results   [facility] 7 or 8 months ago       If the patient is female:    4. For patients aged 41-77: Has the patient had a mammogram within the past 2 years? NA - based on age or sex      11. For patients aged 21-65: Has the patient had a pap smear?  NA - based on age or sex    Health Maintenance Due   Topic Date Due    Hepatitis C Screening  Never done    COVID-19 Vaccine (1) Never done    Pneumococcal 0-64 years (1 - PCV) Never done    DTaP/Tdap/Td series (1 - Tdap) Never done    Colorectal Cancer Screening Combo  Never done    Lipid Screen  01/22/2020    Medicare Yearly Exam  02/06/2020    Shingrix Vaccine Age 50> (1 of 2) Never done    Low dose CT lung screening  Never done

## 2022-09-12 ENCOUNTER — NURSE TRIAGE (OUTPATIENT)
Dept: OTHER | Facility: CLINIC | Age: 52
End: 2022-09-12

## 2022-09-12 NOTE — TELEPHONE ENCOUNTER
Received call from [facility] at [facility] with Red Flag Complaint. Subjective: Caller states \"has left shoulder pain. \"     Current Symptoms: left shoulder pain. Concerned about rotator cuff. Was throwing stick with dog and injured the shoulder. Has also been painting . Moving or lifting arm makes pain worse. No swelling    Onset: 2 days ago; gradual    Associated Symptoms: NA    Pain Severity: 6/10; ; constant    Temperature: denies     What has been tried: ibuprofen    LMP: NA Pregnant: NA    Recommended disposition: See in Office Today    Care advice provided, patient verbalizes understanding; denies any other questions or concerns; instructed to call back for any new or worsening symptoms. Patient/Caller agrees with recommended disposition; writer provided warm transfer to [facility] for appointment scheduling    Attention Provider: Thank you for allowing me to participate in the care of your patient. The patient was connected to triage in response to information provided to the [facility]. Please do not respond through this encounter as the response is not directed to a shared pool.       Reason for Disposition   Can't move injured shoulder normally (e.g., full range of motion, able to touch top of head)    Protocols used: Shoulder Injury-ADULT-[place]

## 2022-10-28 ENCOUNTER — VIRTUAL VISIT (OUTPATIENT)
Dept: FAMILY MEDICINE CLINIC | Age: 52
End: 2022-10-28
Payer: MEDICARE

## 2022-10-28 DIAGNOSIS — H10.30 ACUTE CONJUNCTIVITIS, UNSPECIFIED ACUTE CONJUNCTIVITIS TYPE, UNSPECIFIED LATERALITY: Primary | ICD-10-CM

## 2022-10-28 DIAGNOSIS — L20.9 ATOPIC DERMATITIS, UNSPECIFIED TYPE: ICD-10-CM

## 2022-10-28 PROCEDURE — G8432 DEP SCR NOT DOC, RNG: HCPCS | Performed by: FAMILY MEDICINE

## 2022-10-28 PROCEDURE — 3017F COLORECTAL CA SCREEN DOC REV: CPT | Performed by: FAMILY MEDICINE

## 2022-10-28 PROCEDURE — 99213 OFFICE O/P EST LOW 20 MIN: CPT | Performed by: FAMILY MEDICINE

## 2022-10-28 PROCEDURE — G8427 DOCREV CUR MEDS BY ELIG CLIN: HCPCS | Performed by: FAMILY MEDICINE

## 2022-10-28 RX ORDER — AMMONIUM LACTATE 12 G/100G
CREAM TOPICAL 2 TIMES DAILY
Qty: 140 G | Refills: 3 | Status: SHIPPED | OUTPATIENT
Start: 2022-10-28

## 2022-10-28 RX ORDER — DOXYCYCLINE 100 MG/1
100 CAPSULE ORAL 2 TIMES DAILY
Qty: 20 CAPSULE | Refills: 0 | Status: SHIPPED | OUTPATIENT
Start: 2022-10-28 | End: 2022-11-07

## 2022-10-28 NOTE — PROGRESS NOTES
HISTORY OF PRESENT ILLNESS  Zelda Hernandez is a 46 y.o. male,      Pt's main concerns were provided on virtual video format visit, a telemed format, COVID-19 vaccinated pt is w/ comorbid medical history and unaware of been exposed to covid-19 individual, Pt Have been trying to stay safe   ,present w/ c/o  Right eyelid swelling  The history is provided by the patient. This is a new problem. The current episode started more than 1 week ago. The problem occurs constantly,    no pain. There is no history of trauma to the eye. There is known exposure to pink eye. The patient does not wear contacts. Associated symptoms include no blurred vision, slight sticky eye in amd and slight discharge, patient has no double vision, photophobia, but with + eye redness   Pertinent negatives include no nausea and no fever. Patient has tried water, and commercial eye wash for the symptoms. The treatment provided no relief. Current Outpatient Medications   Medication Sig Dispense Refill    doxycycline (VIBRAMYCIN) 100 mg capsule Take 1 Capsule by mouth two (2) times a day for 10 days. 20 Capsule 0    ammonium lactate (AMLACTIN) 12 % topical cream Apply  to affected area two (2) times a day. rub in to affected area well 140 g 3    pregabalin (LYRICA) 300 mg capsule Take 300 mg by mouth two (2) times a day. azithromycin (ZITHROMAX) 250 mg tablet 2 first day then one tab daily till finished (Patient not taking: Reported on 10/28/2022) 6 Tablet 0    neomycin-polymyxin-hydrocortisone, buffered, (PEDIOTIC) 3.5-10,000-1 mg/mL-unit/mL-% otic suspension Administer 3 Drops in left ear four (4) times daily.  Indications: outer ear inflammation due to allergy or infection (Patient not taking: Reported on 10/28/2022) 10 mL 0    methylPREDNISolone (MEDROL DOSEPACK) 4 mg tablet As directed for 6 days one package (Patient not taking: No sig reported) 1 Dose Pack 0    albuterol (Ventolin HFA) 90 mcg/actuation inhaler INHALE 1 PUFF BY MOUTH EVERY 4 HOURS AS NEEDED FOR WHEEZING (Patient not taking: No sig reported) 18 g 12    naloxone (NARCAN) 4 mg/actuation nasal spray Use 1 spray intranasally, then discard. Repeat with new spray every 2 min as needed for opioid overdose symptoms, alternating nostrils. (Patient not taking: No sig reported) 2 Each 1    fexofenadine (ALLEGRA) 180 mg tablet Take 1 Tab by mouth daily as needed for Allergies. (Patient not taking: No sig reported) 30 Tab 2    diclofenac (VOLTAREN) 1 % gel Apply 4 g to affected area four (4) times daily. (Patient not taking: No sig reported) 100 g 1    nicotine 10 mg/mL spry 1 Act by Nasal route two (2) times daily as needed (prn). (Patient not taking: No sig reported) 10 mL 3    clobetasol (TEMOVATE) 0.05 % topical cream Apply  to affected area two (2) times a day. (Patient not taking: No sig reported) 60 g 2    nystatin (MYCOSTATIN) topical cream Apply  to affected area two (2) times a day. (Patient not taking: No sig reported) 30 g 2    guaiFENesin ER (MUCINEX) 600 mg ER tablet Take 1 Tab by mouth two (2) times a day. (Patient not taking: No sig reported) 15 Tab 0    rosuvastatin (CRESTOR) 10 mg tablet Take 1 Tab by mouth nightly. (Patient not taking: No sig reported) 30 Tab 6    aspirin delayed-release 81 mg tablet Take 1 Tab by mouth daily. (Patient not taking: No sig reported) 30 Tab 11    DULoxetine (CYMBALTA) 30 mg capsule Take 30 mg by mouth two (2) times a day. (Patient not taking: No sig reported)      tamsulosin (FLOMAX) 0.4 mg capsule Take 0.4 mg by mouth daily. (Patient not taking: No sig reported)      pantoprazole (PROTONIX) 40 mg tablet Take 1 Tab by mouth daily. (Patient not taking: No sig reported) 40 Tab 2    ondansetron (ZOFRAN ODT) 8 mg disintegrating tablet Take 1 Tab by mouth every eight (8) hours as needed for Nausea. (Patient not taking: No sig reported) 30 Tab 0     Allergies   Allergen Reactions    Penicillin G Other (comments)     \"Swelling\" at age 5yo.        Past Medical History:   Diagnosis Date    Arm pain, anterior, left 8/23/2019    Arthritis     Chronic pain     GERD (gastroesophageal reflux disease)     Stenosis of cervical spine with myelopathy ([facility] [address] 3/24/2016     Past Surgical History:   Procedure Laterality Date    HX ORTHOPAEDIC      Spinal Fusion    HX TONSILLECTOMY       Family History   Problem Relation Age of Onset    Heart defect Mother         IRREGULAR HEARTBEAT    Other Brother         PERIPHERAL ARTERY DISEASE    Anesth Problems Neg Hx      Social History     Tobacco Use    Smoking status: Every Day     Packs/day: 1.00     Years: 20.00     Pack years: 20.00     Types: Cigarettes    Smokeless tobacco: Never   Substance Use Topics    Alcohol use: Yes     Comment: MONTHLY      Lab Results   Component Value Date/Time    Hemoglobin A1c 5.0 03/16/2016 09:33 AM    Glucose 98 01/22/2019 03:09 PM    Creatinine 0.99 01/22/2019 03:09 PM      Lab Results   Component Value Date/Time    Cholesterol, total 179 01/22/2019 03:09 PM    HDL Cholesterol 30 (L) 01/22/2019 03:09 PM        Review of Systems   Constitutional:  Negative for chills and fever. HENT:  Negative for congestion and nosebleeds. Eyes:  Positive for blurred vision, discharge and redness. Negative for pain. Respiratory:  Negative for cough, shortness of breath and wheezing. Cardiovascular:  Negative for chest pain and leg swelling. Gastrointestinal:  Negative for constipation, diarrhea, nausea and vomiting. Genitourinary:  Negative for dysuria and frequency. Musculoskeletal:  Negative for joint pain and myalgias. Skin:  Positive for rash. Negative for itching. Neurological:  Negative for dizziness, loss of consciousness and headaches. Psychiatric/Behavioral:  Negative for depression. The patient is not nervous/anxious and does not have insomnia. Physical Exam  Constitutional:       Appearance: Normal appearance. HENT:      Head: Normocephalic and atraumatic.       Nose: Nose normal. No congestion. Mouth/Throat:      Mouth: Mucous membranes are dry. Pharynx: Posterior oropharyngeal erythema present. Eyes:      General:         Right eye: Discharge present. Skin:     Findings: Erythema and rash present. Neurological:      Mental Status: He is alert and oriented to person, place, and time. Psychiatric:         Mood and Affect: Mood normal.         Behavior: Behavior normal.         Thought Content: Thought content normal.         Judgment: Judgment normal.       ASSESSMENT and PLAN    ICD-10-CM ICD-9-CM    1. Acute conjunctivitis, unspecified acute conjunctivitis type, unspecified laterality  H10.30 372.00 doxycycline (VIBRAMYCIN) 100 mg capsule      2.  Atopic dermatitis, unspecified type  L20.9 691.8 ammonium lactate (AMLACTIN) 12 % topical cream        lab results and schedule of future lab studies reviewed with patient  reviewed diet, exercise and weight control

## 2022-10-28 NOTE — PROGRESS NOTES
Chief Complaint   Patient presents with    Stye     Patient believes he has a stye on the right eye. Patient asking about lotion because his hands are always dry and cracked even after using gold bond. 1. \"Have you been to the ER, urgent care clinic since your last visit? Hospitalized since your last visit? \" No    2. \"Have you seen or consulted any other health care providers outside of the [address] [place] [address] since your last visit? \" No     3. For patients aged 39-70: Has the patient had a colonoscopy / FIT/ Cologuard? No      If the patient is female:    4. For patients aged 41-77: Has the patient had a mammogram within the past 2 years? NA - based on age or sex      11. For patients aged 21-65: Has the patient had a pap smear?  NA - based on age or sex

## 2022-12-13 ENCOUNTER — VIRTUAL VISIT (OUTPATIENT)
Dept: FAMILY MEDICINE CLINIC | Age: 52
End: 2022-12-13
Payer: MEDICARE

## 2022-12-13 DIAGNOSIS — J20.9 ACUTE BRONCHITIS, UNSPECIFIED ORGANISM: Primary | ICD-10-CM

## 2022-12-13 PROCEDURE — 99442 PR PHYS/QHP TELEPHONE EVALUATION 11-20 MIN: CPT | Performed by: STUDENT IN AN ORGANIZED HEALTH CARE EDUCATION/TRAINING PROGRAM

## 2022-12-13 RX ORDER — METHYLPREDNISOLONE 4 MG/1
TABLET ORAL
Qty: 1 DOSE PACK | Refills: 0 | Status: SHIPPED | OUTPATIENT
Start: 2022-12-13

## 2022-12-13 RX ORDER — AZITHROMYCIN 250 MG/1
TABLET, FILM COATED ORAL
Qty: 6 TABLET | Refills: 0 | Status: SHIPPED | OUTPATIENT
Start: 2022-12-13 | End: 2022-12-18

## 2022-12-13 RX ORDER — ALBUTEROL SULFATE 90 UG/1
AEROSOL, METERED RESPIRATORY (INHALATION)
Qty: 18 G | Refills: 0 | Status: SHIPPED | OUTPATIENT
Start: 2022-12-13 | End: 2022-12-15

## 2022-12-13 NOTE — PROGRESS NOTES
[address] Cheyenne Lema [address] [place] [address]  [phone]    Daphney Spivey (: 1970) is a 46 y.o. male, established patient, here for evaluation of the following chief complaint(s): URI symptoms    SUBJECTIVE:    Pt would like to be evaluated for the problem(s) listed above: Over the past one week, the patient has developed a deep, wet cough with associated chest discomfort and mild shortness of breath. Patient denies any fever, chills, chest pain at rest, nausea or vomiting. Review of systems:     A comprehensive review of systems was negative except for that written in the History of Present Illness. PHYSICAL EXAM:    General: Patient speaking in complete sentences without effort. Normal speech and cooperative. Due to this being a Virtual Check-in/Telephone evaluation, many elements of the physical examination are unable to be assessed. ASSESSMENT/PLAN:      ICD-10-CM ICD-9-CM    1. Acute bronchitis, unspecified organism  J20.9 466.0 azithromycin (ZITHROMAX) 250 mg tablet      methylPREDNISolone (MEDROL DOSEPACK) 4 mg tablet      albuterol (Ventolin HFA) 90 mcg/actuation inhaler        1. Acute bronchitis, unspecified organism    - azithromycin (ZITHROMAX) 250 mg tablet; Take 2 tablets today, then take 1 tablet daily    - methylPREDNISolone (MEDROL DOSEPACK) 4 mg tablet; Follow instructions on dosepack    - albuterol (Ventolin HFA) 90 mcg/actuation inhaler; INHALE 1 PUFF BY MOUTH EVERY 4 HOURS AS NEEDED FOR WHEEZING      - Continue conservative measures; Tylenol/NSAIDs for pain, Cepacol throat lozenges for sore throat,  Flonase/saline rinse for congestion  - Discussed precautions with patient    - RTC prn for review if persistent symptoms, or go to the ER if worsening         Return if symptoms worsen or fail to improve. We discussed the expected course, resolution and complications of the diagnosis(es) in detail.  Patient was in [place] at the time of consultation. Medication risks, benefits, costs, interactions, and alternatives were discussed as indicated. I advised him to contact the office if his condition worsens, changes or fails to improve as anticipated. He expressed understanding with the diagnosis(es) and plan. Patient understands that this encounter was a temporary measure, and the importance of further follow up and examination was emphasized. Patient verbalized understanding. On this date 12/13/22 I have spent 20 minutes reviewing previous notes, test results and audio only with the patient discussing the diagnosis and importance of compliance with the treatment plan as well as documenting on the day of the visit. Daphney Spivey is being evaluated by a Virtual Visit (AUDIO ONLY) encounter to address concerns as mentioned above. A caregiver was present when appropriate. Due to this being a TeleHealth encounter (During [facility] public health emergency), evaluation of the following organ systems was limited: Vitals/Constitutional/EENT/Resp/CV/GI//MS/Neuro/Skin/Heme-Lymph-Imm. Pursuant to the emergency declaration under the [address] [place] [address] authority and the [employer] Act, this Virtual Visit was conducted with patient's (and/or legal guardian's) consent, to reduce the patient's risk of exposure to COVID-19 and provide necessary medical care. The patient (and/or legal guardian) has also been advised to contact this office for worsening conditions or problems, and seek emergency medical treatment and/or call 911 if deemed necessary. Patient identification was verified at the start of the visit: YES    Services were provided through a video synchronous discussion virtually to substitute for in-person clinic visit. Patient was located at home and provider was located in office or at home.      An electronic signature was used to authenticate this note.     Signed By: Vitaly Morrison MD     December 13, 2022      CPT:  13477 (5-10 minutes)  77381 (11-20 minutes)  79901 (21-30 minutes)    Medicare:   - Virtual Check-in

## 2023-05-17 ENCOUNTER — TELEMEDICINE (OUTPATIENT)
Age: 53
End: 2023-05-17

## 2023-05-17 DIAGNOSIS — R21 RASH AND NONSPECIFIC SKIN ERUPTION: ICD-10-CM

## 2023-05-17 DIAGNOSIS — L23.7 CONTACT DERMATITIS DUE TO POISON IVY: Primary | ICD-10-CM

## 2023-05-17 DIAGNOSIS — G99.2 STENOSIS OF CERVICAL SPINE WITH MYELOPATHY (HCC): ICD-10-CM

## 2023-05-17 DIAGNOSIS — M48.02 STENOSIS OF CERVICAL SPINE WITH MYELOPATHY (HCC): ICD-10-CM

## 2023-05-17 PROCEDURE — 99213 OFFICE O/P EST LOW 20 MIN: CPT | Performed by: FAMILY MEDICINE

## 2023-05-17 RX ORDER — TRAMADOL HYDROCHLORIDE 50 MG/1
50 TABLET ORAL EVERY 8 HOURS PRN
Qty: 21 TABLET | Refills: 0 | Status: SHIPPED | OUTPATIENT
Start: 2023-05-17 | End: 2023-05-24

## 2023-05-17 RX ORDER — TRIAMCINOLONE ACETONIDE 1 MG/G
CREAM TOPICAL
Qty: 45 G | Refills: 0 | Status: SHIPPED | OUTPATIENT
Start: 2023-05-17

## 2023-05-17 RX ORDER — METHYLPREDNISOLONE 4 MG/1
TABLET ORAL
Qty: 21 TABLET | Refills: 0 | Status: SHIPPED | OUTPATIENT
Start: 2023-05-17 | End: 2023-05-23

## 2023-05-17 ASSESSMENT — PATIENT HEALTH QUESTIONNAIRE - PHQ9
SUM OF ALL RESPONSES TO PHQ QUESTIONS 1-9: 0
SUM OF ALL RESPONSES TO PHQ9 QUESTIONS 1 & 2: 0
2. FEELING DOWN, DEPRESSED OR HOPELESS: 0
SUM OF ALL RESPONSES TO PHQ QUESTIONS 1-9: 0
SUM OF ALL RESPONSES TO PHQ QUESTIONS 1-9: 0
1. LITTLE INTEREST OR PLEASURE IN DOING THINGS: 0
SUM OF ALL RESPONSES TO PHQ QUESTIONS 1-9: 0

## 2023-05-17 NOTE — PROGRESS NOTES
Deb Neely (:  1970) is a Established patient, presenting virtually for evaluation of the following:    Rash of the face and neck area, patient with history of shingles and poison ivy rash states that this one is very painful, and tried otc not going away,     unfortunately patient states that the rash comes and go   and is not getting better painful started couple of days ago, tried alcohol washing and OTC antibiotic ointments, ++ tingles and not pain full, states that is expanding red, and not  swelled up,   with itchiness       Constitutional: no chills and fever,  , nad     HENT: no ear pain or nosebleeds. No blurred vision    Respiratory: no shortness of breath, wheezing cough     Cardiovascular: Has no chest pain, ,and racing heart . Gastrointestinal: No constipation, diarrhea, nausea and vomiting. Genitourinary: No frequency. Musculoskeletal: Negative for joint pain. Skin: +++ itching pain, ++++ rash. Neurological: Negative for dizziness, no tremors  Psychiatric/Behavioral: Negative for depression   is not nervous/anxious. and not depressed the patient is not nervous/anxious. General: alert, cooperative, no distress   Mental  status: normal mood, behavior, speech, dress, motor activity, and thought processes, able to follow commands   HENT: NCAT   Neck: no visualized mass   Resp: no respiratory distress   Neuro: no gross deficits   Skin: no discoloration or lesions of concern on visible areas   Psychiatric: normal affect, consistent with stated mood, no evidence of hallucinations               Assessment & Plan   Below is the assessment and plan developed based on review of pertinent history, physical exam, labs, studies, and medications. 1. Contact dermatitis due to poison ivy  -     methylPREDNISolone (MEDROL DOSEPACK) 4 MG tablet; Take by mouth., Disp-21 tablet, R-0Normal  -     triamcinolone (KENALOG) 0.1 % cream; Apply topically 2 times daily. , Disp-45 g, R-0, Normal  -

## 2023-05-17 NOTE — PROGRESS NOTES
Chief Complaint   Patient presents with    Poison Ivy     All over and itching     AMB Abuse Screening 2023   Do you ever feel afraid of your partner? N   Are you in a relationship with someone who physically or mentally threatens you? N   Is it safe for you to go home? Y       1. Have you been to the ER, urgent care clinic since your last visit? Hospitalized since your last visit? No    2. Have you seen or consulted any other health care providers outside of the [address] [place] [address] since your last visit? Include any pap smears or colon screening. No    The patient, Adrian Sullivan, identity was verified by  Name and .

## 2023-05-18 RX ORDER — ALBUTEROL SULFATE 90 UG/1
AEROSOL, METERED RESPIRATORY (INHALATION)
Qty: 54 G | Refills: 3 | Status: SHIPPED | OUTPATIENT
Start: 2023-05-18

## 2023-05-22 ENCOUNTER — TELEPHONE (OUTPATIENT)
Age: 53
End: 2023-05-22

## 2023-05-24 ENCOUNTER — OFFICE VISIT (OUTPATIENT)
Age: 53
End: 2023-05-24
Payer: MEDICARE

## 2023-05-24 VITALS
SYSTOLIC BLOOD PRESSURE: 122 MMHG | WEIGHT: 225 LBS | DIASTOLIC BLOOD PRESSURE: 83 MMHG | HEART RATE: 69 BPM | HEIGHT: 73 IN | OXYGEN SATURATION: 94 % | RESPIRATION RATE: 16 BRPM | TEMPERATURE: 98.3 F | BODY MASS INDEX: 29.82 KG/M2

## 2023-05-24 DIAGNOSIS — R53.83 OTHER FATIGUE: ICD-10-CM

## 2023-05-24 DIAGNOSIS — Z12.5 SCREENING FOR PROSTATE CANCER: ICD-10-CM

## 2023-05-24 DIAGNOSIS — Z12.11 SCREENING FOR MALIGNANT NEOPLASM OF COLON: ICD-10-CM

## 2023-05-24 DIAGNOSIS — Z83.3 FAMILY HISTORY OF DIABETES MELLITUS: ICD-10-CM

## 2023-05-24 DIAGNOSIS — L23.7 CONTACT DERMATITIS DUE TO POISON IVY: ICD-10-CM

## 2023-05-24 DIAGNOSIS — E78.00 HYPERCHOLESTEROLEMIA: ICD-10-CM

## 2023-05-24 DIAGNOSIS — L03.319 CELLULITIS OF TRUNK, UNSPECIFIED SITE OF TRUNK: ICD-10-CM

## 2023-05-24 DIAGNOSIS — Z12.5 ENCOUNTER FOR SCREENING FOR MALIGNANT NEOPLASM OF PROSTATE: ICD-10-CM

## 2023-05-24 DIAGNOSIS — Z00.00 MEDICARE ANNUAL WELLNESS VISIT, SUBSEQUENT: Primary | ICD-10-CM

## 2023-05-24 PROCEDURE — PBSHW PBB SHADOW CHARGE: Performed by: FAMILY MEDICINE

## 2023-05-24 PROCEDURE — G0439 PPPS, SUBSEQ VISIT: HCPCS | Performed by: FAMILY MEDICINE

## 2023-05-24 RX ORDER — BETAMETHASONE DIPROPIONATE 0.05 %
OINTMENT (GRAM) TOPICAL
Qty: 45 G | Refills: 1 | Status: SHIPPED | OUTPATIENT
Start: 2023-05-24

## 2023-05-24 RX ORDER — CLINDAMYCIN HYDROCHLORIDE 300 MG/1
300 CAPSULE ORAL 2 TIMES DAILY
Qty: 14 CAPSULE | Refills: 0 | Status: SHIPPED | OUTPATIENT
Start: 2023-05-24 | End: 2023-05-31

## 2023-05-24 RX ORDER — HYDROXYZINE HYDROCHLORIDE 10 MG/1
10 TABLET, FILM COATED ORAL EVERY 8 HOURS PRN
Qty: 30 TABLET | Refills: 0 | Status: SHIPPED | OUTPATIENT
Start: 2023-05-24 | End: 2023-06-03

## 2023-05-24 RX ORDER — ALBUTEROL SULFATE 90 UG/1
AEROSOL, METERED RESPIRATORY (INHALATION)
Qty: 54 G | Refills: 3 | Status: SHIPPED | OUTPATIENT
Start: 2023-05-24

## 2023-05-24 RX ORDER — TRIAMCINOLONE ACETONIDE 40 MG/ML
40 INJECTION, SUSPENSION INTRA-ARTICULAR; INTRAMUSCULAR ONCE
Status: COMPLETED | OUTPATIENT
Start: 2023-05-24 | End: 2023-05-24

## 2023-05-24 RX ORDER — NALOXONE HYDROCHLORIDE 0.4 MG/ML
0.4 INJECTION, SOLUTION INTRAMUSCULAR; INTRAVENOUS; SUBCUTANEOUS PRN
Qty: 1 ML | Refills: 0 | Status: SHIPPED | OUTPATIENT
Start: 2023-05-24

## 2023-05-24 RX ADMIN — TRIAMCINOLONE ACETONIDE 40 MG: 40 INJECTION, SUSPENSION INTRA-ARTICULAR; INTRAMUSCULAR at 12:15

## 2023-05-24 SDOH — ECONOMIC STABILITY: INCOME INSECURITY: HOW HARD IS IT FOR YOU TO PAY FOR THE VERY BASICS LIKE FOOD, HOUSING, MEDICAL CARE, AND HEATING?: NOT HARD AT ALL

## 2023-05-24 SDOH — ECONOMIC STABILITY: FOOD INSECURITY: WITHIN THE PAST 12 MONTHS, THE FOOD YOU BOUGHT JUST DIDN'T LAST AND YOU DIDN'T HAVE MONEY TO GET MORE.: NEVER TRUE

## 2023-05-24 SDOH — ECONOMIC STABILITY: HOUSING INSECURITY
IN THE LAST 12 MONTHS, WAS THERE A TIME WHEN YOU DID NOT HAVE A STEADY PLACE TO SLEEP OR SLEPT IN A SHELTER (INCLUDING NOW)?: NO

## 2023-05-24 SDOH — ECONOMIC STABILITY: FOOD INSECURITY: WITHIN THE PAST 12 MONTHS, YOU WORRIED THAT YOUR FOOD WOULD RUN OUT BEFORE YOU GOT MONEY TO BUY MORE.: NEVER TRUE

## 2023-05-24 ASSESSMENT — PATIENT HEALTH QUESTIONNAIRE - PHQ9
SUM OF ALL RESPONSES TO PHQ QUESTIONS 1-9: 0
SUM OF ALL RESPONSES TO PHQ9 QUESTIONS 1 & 2: 0
SUM OF ALL RESPONSES TO PHQ QUESTIONS 1-9: 0
1. LITTLE INTEREST OR PLEASURE IN DOING THINGS: 0
SUM OF ALL RESPONSES TO PHQ QUESTIONS 1-9: 0
SUM OF ALL RESPONSES TO PHQ QUESTIONS 1-9: 0
2. FEELING DOWN, DEPRESSED OR HOPELESS: 0

## 2023-05-24 ASSESSMENT — LIFESTYLE VARIABLES
HOW OFTEN DO YOU HAVE A DRINK CONTAINING ALCOHOL: MONTHLY OR LESS
HOW MANY STANDARD DRINKS CONTAINING ALCOHOL DO YOU HAVE ON A TYPICAL DAY: 3 OR 4

## 2023-05-24 NOTE — PROGRESS NOTES
Chief Complaint   Patient presents with    Rash     All over  on body     Medicare AWV     Vitals:    23 1107   BP: 122/83   Pulse: 69   Resp: 16   Temp: 98.3 °F (36.8 °C)   TempSrc: Oral   SpO2: 94%   Weight: 225 lb (102.1 kg)   Height: 6' 1\" (1.854 m)      AMB Abuse Screening 2023   Do you ever feel afraid of your partner? N   Are you in a relationship with someone who physically or mentally threatens you? N   Is it safe for you to go home? Y       1. Have you been to the ER, urgent care clinic since your last visit? Hospitalized since your last visit? No    2. Have you seen or consulted any other health care providers outside of the [address] [place] [address] since your last visit? Include any pap smears or colon screening. No    The patient, Susan Mtz, identity was verified by  Name and .

## 2023-05-24 NOTE — PROGRESS NOTES
Per orders of Dr. Natalie Reed , injection of  triamcinolone 40mg/ml was given in the right deltoid IM . Patient tolerated it well. Patient instructed to report any adverse reaction to me immediately.

## 2023-05-26 NOTE — PROGRESS NOTES
Medicare Annual Wellness Visit    Angela Nicholas is here for Rash (All over  on body ) and Medicare AWV    Assessment & Plan   Medicare annual wellness visit, subsequent  Contact dermatitis due to poison ivy  -     hydrOXYzine HCl (ATARAX) 10 MG tablet; Take 1 tablet by mouth every 8 hours as needed for Itching, Disp-30 tablet, R-0Normal  -     betamethasone dipropionate 0.05 % ointment; Apply topically daily. , Disp-45 g, R-1, Normal  -     naloxone (NARCAN) 0.4 MG/ML SOCT injection; Infuse 1 mL intravenously as needed (OD), Disp-1 mL, R-0Normal  -     clindamycin (CLEOCIN) 300 MG capsule; Take 1 capsule by mouth 2 times daily for 7 days, Disp-14 capsule, R-0Normal  -     triamcinolone acetonide (KENALOG-40) injection 40 mg; 40 mg, IntraMUSCular, ONCE, 1 dose, On Wed 5/24/23 at 1230  Cellulitis of trunk, unspecified site of trunk  -     hydrOXYzine HCl (ATARAX) 10 MG tablet; Take 1 tablet by mouth every 8 hours as needed for Itching, Disp-30 tablet, R-0Normal  -     betamethasone dipropionate 0.05 % ointment; Apply topically daily. , Disp-45 g, R-1, Normal  -     naloxone (NARCAN) 0.4 MG/ML SOCT injection; Infuse 1 mL intravenously as needed (OD), Disp-1 mL, R-0Normal  -     clindamycin (CLEOCIN) 300 MG capsule; Take 1 capsule by mouth 2 times daily for 7 days, Disp-14 capsule, R-0Normal  -     triamcinolone acetonide (KENALOG-40) injection 40 mg; 40 mg, IntraMUSCular, ONCE, 1 dose, On Wed 5/24/23 at 200  Screening for prostate cancer  -     [facility] - Mary Howard MD, [facility] ([facility]ana Browne)  Screening for malignant neoplasm of colon  -     PSA Screening; Future  Other fatigue  -     CBC; Future  -     Comprehensive Metabolic Panel; Future  -     TSH; Future  -     Lipid Panel; Future  Family history of diabetes mellitus  -     CBC; Future  -     Comprehensive Metabolic Panel; Future  -     TSH; Future  -     Lipid Panel; Future  Hypercholesterolemia  -     CBC;  Future  -     Comprehensive Metabolic

## 2023-11-28 DIAGNOSIS — L23.7 CONTACT DERMATITIS DUE TO POISON IVY: ICD-10-CM

## 2023-11-28 DIAGNOSIS — G99.2 STENOSIS OF CERVICAL SPINE WITH MYELOPATHY (HCC): ICD-10-CM

## 2023-11-28 DIAGNOSIS — M48.02 STENOSIS OF CERVICAL SPINE WITH MYELOPATHY (HCC): ICD-10-CM

## 2023-11-28 DIAGNOSIS — R21 RASH AND NONSPECIFIC SKIN ERUPTION: ICD-10-CM

## 2023-11-28 DIAGNOSIS — L03.319 CELLULITIS OF TRUNK, UNSPECIFIED SITE OF TRUNK: ICD-10-CM

## 2023-11-28 NOTE — TELEPHONE ENCOUNTER
Last appointment: 5/24/23  Next appointment: Sarah Marroquin to follow-up 11/24/23  Previous refill encounter(s): 5/24/23 Betameth #45g with 1 refill, Albuterol #3 with 3 refills, 5/17/23 Kenalog #45g    Requested Prescriptions     Pending Prescriptions Disp Refills    triamcinolone (KENALOG) 0.1 % cream 45 g 0     Sig: Apply topically 2 times daily. betamethasone dipropionate 0.05 % ointment 45 g 0     Sig: Apply topically daily. albuterol sulfate HFA (PROVENTIL;VENTOLIN;PROAIR) 108 (90 Base) MCG/ACT inhaler 1 each 0     Sig: INHALE 1 PUFF BY MOUTH EVERY 4 HOURS AS NEEDED FOR WHEEZING         For Pharmacy Admin Tracking Only    Program: Medication Refill  CPA in place:    Recommendation Provided To:    Intervention Detail: New Rx: 3, reason: Patient Preference  Intervention Accepted By:   Destini Mesa Closed?:    Time Spent (min): 5

## 2023-11-28 NOTE — TELEPHONE ENCOUNTER
----- Message from Robert Caicedo sent at 11/28/2023 11:09 AM EST -----  Subject: Refill Request    QUESTIONS  Name of Medication? triamcinolone (KENALOG) 0.1 % cream  Patient-reported dosage and instructions? As needed  How many days do you have left? 0  Preferred Pharmacy? [facility] #[id number]  Pharmacy phone number (if available)? [phone]  ---------------------------------------------------------------------------  --------------,  Name of Medication? betamethasone dipropionate 0.05 % ointment  Patient-reported dosage and instructions? As needed  How many days do you have left? 0  Preferred Pharmacy? [facility] #[id number]  Pharmacy phone number (if available)? [phone]  ---------------------------------------------------------------------------  --------------,  Name of Medication? albuterol sulfate HFA (PROVENTIL;VENTOLIN;PROAIR) 108   (90 Base) MCG/ACT inhaler  Patient-reported dosage and instructions? As needed  How many days do you have left? 0  Preferred Pharmacy? [facility] #[id number]  Pharmacy phone number (if available)? [phone]  ---------------------------------------------------------------------------  --------------  CALL BACK INFO  What is the best way for the office to contact you? OK to leave message on   voicemail  Preferred Call Back Phone Number? [phone]  ---------------------------------------------------------------------------  --------------  SCRIPT ANSWERS  Relationship to Patient?  Self

## 2023-11-30 RX ORDER — BETAMETHASONE DIPROPIONATE 0.05 %
OINTMENT (GRAM) TOPICAL
Qty: 45 G | Refills: 0 | Status: SHIPPED | OUTPATIENT
Start: 2023-11-30

## 2023-11-30 RX ORDER — ALBUTEROL SULFATE 90 UG/1
AEROSOL, METERED RESPIRATORY (INHALATION)
Qty: 1 EACH | Refills: 0 | Status: SHIPPED | OUTPATIENT
Start: 2023-11-30

## 2023-11-30 RX ORDER — TRIAMCINOLONE ACETONIDE 1 MG/G
CREAM TOPICAL
Qty: 45 G | Refills: 0 | Status: SHIPPED | OUTPATIENT
Start: 2023-11-30

## 2023-12-12 ENCOUNTER — TELEPHONE (OUTPATIENT)
Age: 53
End: 2023-12-12

## 2023-12-12 NOTE — TELEPHONE ENCOUNTER
Patient wants to be seen ASAP for lower abd pain, he said it is getting worse.  Please give him a call @ [phone]

## 2023-12-13 ENCOUNTER — TELEMEDICINE (OUTPATIENT)
Age: 53
End: 2023-12-13
Payer: MEDICARE

## 2023-12-13 DIAGNOSIS — K40.90 RIGHT GROIN HERNIA: ICD-10-CM

## 2023-12-13 DIAGNOSIS — R21 RASH AND NONSPECIFIC SKIN ERUPTION: ICD-10-CM

## 2023-12-13 DIAGNOSIS — G89.4 CHRONIC PAIN SYNDROME: ICD-10-CM

## 2023-12-13 DIAGNOSIS — M48.02 STENOSIS OF CERVICAL SPINE WITH MYELOPATHY (HCC): ICD-10-CM

## 2023-12-13 DIAGNOSIS — G99.2 STENOSIS OF CERVICAL SPINE WITH MYELOPATHY (HCC): ICD-10-CM

## 2023-12-13 DIAGNOSIS — L23.7 CONTACT DERMATITIS DUE TO POISON IVY: Primary | ICD-10-CM

## 2023-12-13 DIAGNOSIS — Z76.89 POOR DENTITION REQUIRING REFERRAL TO DENTISTRY: ICD-10-CM

## 2023-12-13 PROCEDURE — 99214 OFFICE O/P EST MOD 30 MIN: CPT | Performed by: FAMILY MEDICINE

## 2023-12-13 RX ORDER — TRAMADOL HYDROCHLORIDE 50 MG/1
50 TABLET ORAL EVERY 8 HOURS PRN
Qty: 42 TABLET | Refills: 0 | Status: SHIPPED | OUTPATIENT
Start: 2023-12-13 | End: 2023-12-27

## 2023-12-13 RX ORDER — CLINDAMYCIN HYDROCHLORIDE 300 MG/1
300 CAPSULE ORAL 3 TIMES DAILY
Qty: 21 CAPSULE | Refills: 0 | Status: SHIPPED | OUTPATIENT
Start: 2023-12-13 | End: 2023-12-20

## 2023-12-13 RX ORDER — TRIAMCINOLONE ACETONIDE 1 MG/G
CREAM TOPICAL
Qty: 45 G | Refills: 5 | Status: SHIPPED | OUTPATIENT
Start: 2023-12-13

## 2023-12-13 RX ORDER — POLYETHYLENE GLYCOL 3350 17 G/17G
17 POWDER, FOR SOLUTION ORAL DAILY
Qty: 255 G | Refills: 1 | Status: SHIPPED | OUTPATIENT
Start: 2023-12-13 | End: 2024-01-12

## 2023-12-13 RX ORDER — NALOXONE HYDROCHLORIDE 4 MG/.1ML
SPRAY NASAL
Qty: 1 EACH | Refills: 3 | Status: SHIPPED | OUTPATIENT
Start: 2023-12-13

## 2023-12-13 RX ORDER — METHYLPREDNISOLONE 4 MG/1
TABLET ORAL
Qty: 21 TABLET | Refills: 0 | Status: SHIPPED | OUTPATIENT
Start: 2023-12-13

## 2023-12-13 RX ORDER — MELOXICAM 15 MG/1
15 TABLET ORAL DAILY
Qty: 30 TABLET | Refills: 0 | Status: SHIPPED | OUTPATIENT
Start: 2023-12-13

## 2024-01-04 DIAGNOSIS — K40.90 RIGHT GROIN HERNIA: ICD-10-CM

## 2024-01-04 DIAGNOSIS — G99.2 STENOSIS OF CERVICAL SPINE WITH MYELOPATHY (HCC): ICD-10-CM

## 2024-01-04 DIAGNOSIS — R21 RASH AND NONSPECIFIC SKIN ERUPTION: ICD-10-CM

## 2024-01-04 DIAGNOSIS — L23.7 CONTACT DERMATITIS DUE TO POISON IVY: Primary | ICD-10-CM

## 2024-01-04 DIAGNOSIS — M48.02 STENOSIS OF CERVICAL SPINE WITH MYELOPATHY (HCC): ICD-10-CM

## 2024-01-04 NOTE — TELEPHONE ENCOUNTER
Last appointment: 12/13/23  Next appointment: Advised to follow-up 3/13/24  Previous refill encounter(s): 12/13/23 #42    Requested Prescriptions     Pending Prescriptions Disp Refills    traMADol (ULTRAM) 50 MG tablet 42 tablet 0     Sig: Take 1 tablet by mouth every 8 hours as needed for Pain for up to 14 days. Max Daily Amount: 150 mg         For Pharmacy Admin Tracking Only    Program: Medication Refill  CPA in place:    Recommendation Provided To:   Intervention Detail: New Rx: 1, reason: Patient Preference  Intervention Accepted By:   Gap Closed?:    Time Spent (min): 5

## 2024-01-04 NOTE — TELEPHONE ENCOUNTER
----- Message from Arlyn Christensen sent at 1/4/2024  2:09 PM EST -----  Subject: Refill Request    QUESTIONS  Name of Medication? traMADol (ULTRAM) 50 MG tablet  Patient-reported dosage and instructions? 50 mg, as needed  How many days do you have left? 1  [facility]? [id number]  Pharmacy phone number (if available)? [phone]  Additional Information for Provider? TIME SENSITIVE? Patient is nearly out   of this and he cannot see a GI doctor for 3 more weeks so he is in need.   Please refill.  ---------------------------------------------------------------------------  --------------  CALL BACK INFO  What is the best way for the office to contact you? OK to leave message on   voicemail  Preferred Call Back Phone Number? [phone]  ---------------------------------------------------------------------------  --------------  SCRIPT ANSWERS  Relationship to Patient? Self

## 2024-01-05 RX ORDER — TRAMADOL HYDROCHLORIDE 50 MG/1
50 TABLET ORAL EVERY 8 HOURS PRN
Qty: 42 TABLET | Refills: 0 | Status: SHIPPED | OUTPATIENT
Start: 2024-01-05 | End: 2024-01-19

## 2024-01-11 DIAGNOSIS — K40.90 RIGHT GROIN HERNIA: ICD-10-CM

## 2024-01-11 DIAGNOSIS — G89.4 CHRONIC PAIN SYNDROME: ICD-10-CM

## 2024-01-11 NOTE — TELEPHONE ENCOUNTER
Last appointment: 12/13/23  Next appointment: Advised to follow-up 3/13/24  Previous refill encounter(s): 12/13/23 #30    Requested Prescriptions     Pending Prescriptions Disp Refills    meloxicam (MOBIC) 15 MG tablet [Pharmacy Med Name: MELOXICAM 15MG TABLETS] 30 tablet 2     Sig: TAKE 1 TABLET BY MOUTH DAILY         For Pharmacy Admin Tracking Only    Program: Medication Refill  CPA in place:    Recommendation Provided To:   Intervention Detail: New Rx: 1, reason: Patient Preference  Intervention Accepted By:   Gap Closed?:    Time Spent (min): 5

## 2024-01-12 RX ORDER — MELOXICAM 15 MG/1
15 TABLET ORAL DAILY
Qty: 30 TABLET | Refills: 2 | Status: SHIPPED | OUTPATIENT
Start: 2024-01-12

## 2024-01-23 DIAGNOSIS — K40.90 RIGHT GROIN HERNIA: ICD-10-CM

## 2024-01-23 DIAGNOSIS — L23.7 CONTACT DERMATITIS DUE TO POISON IVY: Primary | ICD-10-CM

## 2024-01-23 DIAGNOSIS — R21 RASH AND NONSPECIFIC SKIN ERUPTION: ICD-10-CM

## 2024-01-23 DIAGNOSIS — M48.02 STENOSIS OF CERVICAL SPINE WITH MYELOPATHY (HCC): ICD-10-CM

## 2024-01-23 DIAGNOSIS — G99.2 STENOSIS OF CERVICAL SPINE WITH MYELOPATHY (HCC): ICD-10-CM

## 2024-01-23 NOTE — TELEPHONE ENCOUNTER
Last appointment: 12/13/23  Next appointment: Advised to follow-up 3/13/24  Previous refill encounter(s): 1/5/24 #42    Requested Prescriptions     Pending Prescriptions Disp Refills    traMADol (ULTRAM) 50 MG tablet 42 tablet 0     Sig: Take 1 tablet by mouth every 8 hours as needed for Pain for up to 14 days. Max Daily Amount: 150 mg         For Pharmacy Admin Tracking Only    Program: Medication Refill  CPA in place:    Recommendation Provided To:   Intervention Detail: New Rx: 1, reason: Patient Preference  Intervention Accepted By:   Gap Closed?:    Time Spent (min): 5

## 2024-01-26 RX ORDER — TRAMADOL HYDROCHLORIDE 50 MG/1
50 TABLET ORAL EVERY 8 HOURS PRN
Qty: 42 TABLET | Refills: 0 | Status: SHIPPED | OUTPATIENT
Start: 2024-01-26 | End: 2024-02-09

## 2024-02-29 DIAGNOSIS — G99.2 STENOSIS OF CERVICAL SPINE WITH MYELOPATHY (HCC): Primary | ICD-10-CM

## 2024-02-29 DIAGNOSIS — M48.02 STENOSIS OF CERVICAL SPINE WITH MYELOPATHY (HCC): Primary | ICD-10-CM

## 2024-02-29 NOTE — TELEPHONE ENCOUNTER
Last appointment: 12/13/23  Next appointment: Advised to follow-up 3/13/24  Previous refill encounter(s): 1/26/24 #42    Requested Prescriptions     Pending Prescriptions Disp Refills    traMADol (ULTRAM) 50 MG tablet 42 tablet 0     Sig: Take 1 tablet by mouth every 8 hours as needed for Pain for up to 14 days. Max Daily Amount: 150 mg         For Pharmacy Admin Tracking Only    Program: Medication Refill  CPA in place:    Recommendation Provided To:   Intervention Detail: New Rx: 1, reason: Patient Preference  Intervention Accepted By:   Gap Closed?:    Time Spent (min): 5

## 2024-03-04 RX ORDER — TRAMADOL HYDROCHLORIDE 50 MG/1
50 TABLET ORAL EVERY 8 HOURS PRN
Qty: 42 TABLET | Refills: 0 | OUTPATIENT
Start: 2024-03-04 | End: 2024-03-18

## 2024-03-06 DIAGNOSIS — G99.2 STENOSIS OF CERVICAL SPINE WITH MYELOPATHY (HCC): ICD-10-CM

## 2024-03-06 DIAGNOSIS — Z76.89 POOR DENTITION REQUIRING REFERRAL TO DENTISTRY: ICD-10-CM

## 2024-03-06 DIAGNOSIS — K40.90 RIGHT GROIN HERNIA: ICD-10-CM

## 2024-03-06 DIAGNOSIS — L23.7 CONTACT DERMATITIS DUE TO POISON IVY: ICD-10-CM

## 2024-03-06 DIAGNOSIS — R21 RASH AND NONSPECIFIC SKIN ERUPTION: ICD-10-CM

## 2024-03-06 DIAGNOSIS — M48.02 STENOSIS OF CERVICAL SPINE WITH MYELOPATHY (HCC): ICD-10-CM

## 2024-03-07 RX ORDER — CLINDAMYCIN HYDROCHLORIDE 300 MG/1
CAPSULE ORAL
Qty: 21 CAPSULE | Refills: 0 | OUTPATIENT
Start: 2024-03-07

## 2024-03-07 NOTE — TELEPHONE ENCOUNTER
Patient has a swollen tooth and is cannot get an appt with the dentist for 3 weeks. He is requesting a refill of the antibiotic.    Last appointment: 12/13/23  Next appointment: Advised to follow-up 3/13/24  Previous refill encounter(s): 1/26/24 Ultram #42, 12/13/23 Cleocin #21    Requested Prescriptions     Pending Prescriptions Disp Refills    clindamycin (CLEOCIN) 300 MG capsule [Pharmacy Med Name: CLINDAMYCIN 300MG CAPSULES] 21 capsule 0     Sig: TAKE 1 CAPSULE BY MOUTH THREE TIMES DAILY FOR 7 DAYS    traMADol (ULTRAM) 50 MG tablet [Pharmacy Med Name: TRAMADOL 50MG TABLETS] 42 tablet 0     Sig: TAKE 1 TABLET BY MOUTH EVERY 8 HOURS FOR UP TO 14 DAYS AS NEEDED FOR PAIN. MAX DAILY AMOUNT: 150 MG         For Pharmacy Admin Tracking Only    Program: Medication Refill  CPA in place:    Recommendation Provided To:   Intervention Detail: New Rx: 2, reason: Patient Preference  Intervention Accepted By:   Gap Closed?:    Time Spent (min): 5

## 2024-03-09 RX ORDER — TRAMADOL HYDROCHLORIDE 50 MG/1
TABLET ORAL
Qty: 42 TABLET | Refills: 0 | OUTPATIENT
Start: 2024-03-09 | End: 2024-03-23

## 2024-03-09 RX ORDER — CLINDAMYCIN HYDROCHLORIDE 300 MG/1
CAPSULE ORAL
Qty: 21 CAPSULE | Refills: 0 | OUTPATIENT
Start: 2024-03-09

## 2024-04-19 DIAGNOSIS — K40.90 RIGHT GROIN HERNIA: ICD-10-CM

## 2024-04-19 DIAGNOSIS — G89.4 CHRONIC PAIN SYNDROME: ICD-10-CM

## 2024-04-19 RX ORDER — MELOXICAM 15 MG/1
15 TABLET ORAL DAILY
Qty: 30 TABLET | Refills: 2 | Status: SHIPPED | OUTPATIENT
Start: 2024-04-19

## 2024-04-19 NOTE — TELEPHONE ENCOUNTER
Last appointment: 12/13/23  Next appointment: 5/2/24  Previous refill encounter(s): 1/12/24 #30 with 2 refills    Requested Prescriptions     Pending Prescriptions Disp Refills    meloxicam (MOBIC) 15 MG tablet [Pharmacy Med Name: MELOXICAM 15MG TABLETS] 30 tablet 2     Sig: TAKE 1 TABLET BY MOUTH DAILY         For Pharmacy Admin Tracking Only    Program: Medication Refill  CPA in place:    Recommendation Provided To:   Intervention Detail: New Rx: 1, reason: Patient Preference  Intervention Accepted By:   Gap Closed?:    Time Spent (min): 5

## 2024-04-24 DIAGNOSIS — G89.4 CHRONIC PAIN SYNDROME: ICD-10-CM

## 2024-04-24 DIAGNOSIS — K40.90 RIGHT GROIN HERNIA: ICD-10-CM

## 2024-04-24 RX ORDER — METHYLPREDNISOLONE 4 MG/1
TABLET ORAL
Qty: 21 TABLET | Refills: 0 | Status: SHIPPED | OUTPATIENT
Start: 2024-04-24

## 2024-04-24 NOTE — TELEPHONE ENCOUNTER
Last appointment: 12/13/23  Next appointment: 5/2/24  Previous refill encounter(s): 12/13/23 #21    Requested Prescriptions     Pending Prescriptions Disp Refills    methylPREDNISolone (MEDROL DOSEPACK) 4 MG tablet [Pharmacy Med Name: METHYLPREDNISOLONE 4MG DOSPAK 21S] 21 tablet 0     Sig: FOLLOW PACKAGE DIRECTIONS         For Pharmacy Admin Tracking Only    Program: Medication Refill  CPA in place:    Recommendation Provided To:   Intervention Detail: New Rx: 1, reason: Patient Preference  Intervention Accepted By:   Gap Closed?:    Time Spent (min): 5

## 2024-05-16 ENCOUNTER — TELEMEDICINE (OUTPATIENT)
Age: 54
End: 2024-05-16
Payer: MEDICARE

## 2024-05-16 DIAGNOSIS — M79.602 ARM PAIN, ANTERIOR, LEFT: ICD-10-CM

## 2024-05-16 DIAGNOSIS — G99.2 STENOSIS OF CERVICAL SPINE WITH MYELOPATHY (HCC): Primary | ICD-10-CM

## 2024-05-16 DIAGNOSIS — L23.7 CONTACT DERMATITIS DUE TO POISON IVY: ICD-10-CM

## 2024-05-16 DIAGNOSIS — M48.02 STENOSIS OF CERVICAL SPINE WITH MYELOPATHY (HCC): Primary | ICD-10-CM

## 2024-05-16 DIAGNOSIS — K02.9 DENTAL CAVITIES: ICD-10-CM

## 2024-05-16 DIAGNOSIS — K43.9 VENTRAL HERNIA WITHOUT OBSTRUCTION OR GANGRENE: ICD-10-CM

## 2024-05-16 DIAGNOSIS — G89.4 CHRONIC PAIN SYNDROME: ICD-10-CM

## 2024-05-16 PROCEDURE — 99214 OFFICE O/P EST MOD 30 MIN: CPT | Performed by: FAMILY MEDICINE

## 2024-05-16 RX ORDER — AZITHROMYCIN 250 MG/1
TABLET, FILM COATED ORAL
Qty: 6 TABLET | Refills: 0 | Status: SHIPPED | OUTPATIENT
Start: 2024-05-16 | End: 2024-05-26

## 2024-05-16 RX ORDER — BACLOFEN 10 MG/1
10 TABLET ORAL 2 TIMES DAILY PRN
Qty: 40 TABLET | Refills: 1 | Status: SHIPPED | OUTPATIENT
Start: 2024-05-16

## 2024-05-16 RX ORDER — METHYLPREDNISOLONE 4 MG/1
TABLET ORAL
Qty: 21 TABLET | Refills: 0 | Status: SHIPPED | OUTPATIENT
Start: 2024-05-16

## 2024-05-16 RX ORDER — TRAMADOL HYDROCHLORIDE 50 MG/1
50 TABLET ORAL EVERY 4 HOURS PRN
Qty: 42 TABLET | Refills: 0 | Status: SHIPPED | OUTPATIENT
Start: 2024-05-16 | End: 2024-05-23

## 2024-05-16 ASSESSMENT — PATIENT HEALTH QUESTIONNAIRE - PHQ9
SUM OF ALL RESPONSES TO PHQ QUESTIONS 1-9: 0
SUM OF ALL RESPONSES TO PHQ QUESTIONS 1-9: 0
SUM OF ALL RESPONSES TO PHQ9 QUESTIONS 1 & 2: 0
SUM OF ALL RESPONSES TO PHQ QUESTIONS 1-9: 0
1. LITTLE INTEREST OR PLEASURE IN DOING THINGS: NOT AT ALL
2. FEELING DOWN, DEPRESSED OR HOPELESS: NOT AT ALL
SUM OF ALL RESPONSES TO PHQ QUESTIONS 1-9: 0

## 2024-05-16 NOTE — PROGRESS NOTES
Rico Padilla, was evaluated through a synchronous (real-time) audio-video encounter. The patient (or guardian if applicable) is aware that this is a billable service, which includes applicable co-pays. This Virtual Visit was conducted with patient's (and/or legal guardian's) consent. Patient identification was verified, and a caregiver was present when appropriate.   The patient was located at Home: [id number] [place] [id number]  Provider was located at Facility (Appt Dept): [address] [place] [id number]  Confirm you are appropriately licensed, registered, or certified to deliver care in the state where the patient is located as indicated above. If you are not or unsure, please re-schedule the visit: Yes, I confirm.     Rico Padilla (:  1970) is a biopsy on theEstablished patient, presenting virtually for evaluation of the following:  Abd hernia  Was referred to gen surgeon epigastric area  wants to the surgeon    Sees the neurosurgeon , was given Baclofen needs refill otherwise the pain not better, a lot of spasmic rx due to his condition, The patient's pain has been a chonic problem ,   present for refills, pt has alot of difficulty walking aournd,   never abused any prescribed meds, currently working, enleliad states that pt is very functional ,   no hx of illicit nor etoh abuse, pt has tried all the other alternative approach for the current pain including PT,  pain management Orthopedic sx ,     meds has been kept in safe place, pt has no hx of MH disoorder    No Etoh or other Illicit use,    pt states that the quality of life has been better since on the current med, CAGE answers no's      Also c/ detal cavity and not able to find one which takes his insurance    Rash of the hand and also exposed to poison ivy,   Started few months ago not better tried alcohol washing and OTC antibiotic ointments, dosenot tingles and not pain full, states that is notexpanding

## 2024-05-16 NOTE — PROGRESS NOTES
Chief Complaint   Patient presents with    Medication Refill       \"Have you been to the ER, urgent care clinic since your last visit?  Hospitalized since your last visit?\"    NO    “Have you seen or consulted any other health care providers outside of [facility] since your last visit?”    NO       The patient, Rico Link, identity was verified by name and

## 2024-05-16 NOTE — TELEPHONE ENCOUNTER
Last appointment: today  Next appointment: none  Previous refill encounter(s): 10/28/22    Requested Prescriptions     Pending Prescriptions Disp Refills    ammonium lactate (AMLACTIN) 12 % cream [Pharmacy Med Name: AMMONIUM LACTATE 12% CREAM 140GM] 140 g 3     Sig: APPLY TO THE AFFECTED AREAS ON THE BODY TWICE DAILY         For Pharmacy Admin Tracking Only    Program: Medication Refill  CPA in place:    Recommendation Provided To:   Intervention Detail: New Rx: 1, reason: Patient Preference  Intervention Accepted By:   Gap Closed?:    Time Spent (min): 5  
Support Present

## 2024-05-17 ENCOUNTER — TELEPHONE (OUTPATIENT)
Age: 54
End: 2024-05-17

## 2024-05-17 RX ORDER — AMMONIUM LACTATE 12 G/100G
CREAM TOPICAL
Qty: 140 G | Refills: 3 | Status: SHIPPED | OUTPATIENT
Start: 2024-05-17

## 2024-05-17 NOTE — TELEPHONE ENCOUNTER
Called patient in regards to a referral tp Dr. Nunez for a ventral hernia. No answer, left a voicemail for a returned call.

## 2024-07-20 DIAGNOSIS — G89.4 CHRONIC PAIN SYNDROME: ICD-10-CM

## 2024-07-20 DIAGNOSIS — K40.90 RIGHT GROIN HERNIA: ICD-10-CM

## 2024-07-22 NOTE — TELEPHONE ENCOUNTER
Last appointment: 5/16/24  Next appointment: none  Previous refill encounter(s): 4/19/24 Mobic #30 with 2 refills, 11/30/23 Albuterol #1    Requested Prescriptions     Pending Prescriptions Disp Refills    meloxicam (MOBIC) 15 MG tablet [Pharmacy Med Name: MELOXICAM 15MG TABLETS] 30 tablet 5     Sig: TAKE 1 TABLET BY MOUTH DAILY    albuterol sulfate HFA (PROVENTIL;VENTOLIN;PROAIR) 108 (90 Base) MCG/ACT inhaler 1 each 5     Sig: INHALE 1 PUFF BY MOUTH EVERY 4 HOURS AS NEEDED FOR WHEEZING         For Pharmacy Admin Tracking Only    Program: Medication Refill  CPA in place:    Recommendation Provided To:   Intervention Detail: New Rx: 2, reason: Patient Preference  Intervention Accepted By:   Gap Closed?:    Time Spent (min): 5

## 2024-07-24 RX ORDER — ALBUTEROL SULFATE 90 UG/1
AEROSOL, METERED RESPIRATORY (INHALATION)
Qty: 1 EACH | Refills: 5 | Status: SHIPPED | OUTPATIENT
Start: 2024-07-24

## 2024-07-24 RX ORDER — MELOXICAM 15 MG/1
15 TABLET ORAL DAILY
Qty: 30 TABLET | Refills: 5 | Status: SHIPPED | OUTPATIENT
Start: 2024-07-24

## 2024-07-29 DIAGNOSIS — G99.2 STENOSIS OF CERVICAL SPINE WITH MYELOPATHY (HCC): ICD-10-CM

## 2024-07-29 DIAGNOSIS — G89.4 CHRONIC PAIN SYNDROME: ICD-10-CM

## 2024-07-29 DIAGNOSIS — M48.02 STENOSIS OF CERVICAL SPINE WITH MYELOPATHY (HCC): ICD-10-CM

## 2024-07-29 DIAGNOSIS — M79.602 ARM PAIN, ANTERIOR, LEFT: ICD-10-CM

## 2024-07-29 DIAGNOSIS — K02.9 DENTAL CAVITIES: ICD-10-CM

## 2024-07-29 DIAGNOSIS — L23.7 CONTACT DERMATITIS DUE TO POISON IVY: ICD-10-CM

## 2024-07-29 NOTE — TELEPHONE ENCOUNTER
Last appointment: 5/16/24  Next appointment: none  Previous refill encounter(s): 5/16/24 #40 with 1 refill    Requested Prescriptions     Pending Prescriptions Disp Refills    baclofen (LIORESAL) 10 MG tablet [Pharmacy Med Name: BACLOFEN 10MG TABLETS] 40 tablet 1     Sig: TAKE 1 TABLET BY MOUTH TWICE DAILY AS NEEDED FOR SPASMS         For Pharmacy Admin Tracking Only    Program: Medication Refill  CPA in place:    Recommendation Provided To:   Intervention Detail: New Rx: 1, reason: Patient Preference  Intervention Accepted By:   Gap Closed?:    Time Spent (min): 5

## 2024-08-01 RX ORDER — BACLOFEN 10 MG/1
TABLET ORAL
Qty: 40 TABLET | Refills: 1 | Status: SHIPPED | OUTPATIENT
Start: 2024-08-01

## 2024-08-13 DIAGNOSIS — G99.2 STENOSIS OF CERVICAL SPINE WITH MYELOPATHY (HCC): ICD-10-CM

## 2024-08-13 DIAGNOSIS — K02.9 DENTAL CAVITIES: ICD-10-CM

## 2024-08-13 DIAGNOSIS — L23.7 CONTACT DERMATITIS DUE TO POISON IVY: ICD-10-CM

## 2024-08-13 DIAGNOSIS — M48.02 STENOSIS OF CERVICAL SPINE WITH MYELOPATHY (HCC): ICD-10-CM

## 2024-08-13 DIAGNOSIS — G89.4 CHRONIC PAIN SYNDROME: ICD-10-CM

## 2024-08-13 DIAGNOSIS — M79.602 ARM PAIN, ANTERIOR, LEFT: ICD-10-CM

## 2024-08-14 NOTE — TELEPHONE ENCOUNTER
Last appointment: 5/16/24  Next appointment: none  Previous refill encounter(s): 5/16/24 #6    Requested Prescriptions     Pending Prescriptions Disp Refills    azithromycin (ZITHROMAX) 250 MG tablet [Pharmacy Med Name: AZITHROMYCIN 250MG TABLETS 6-MARCY] 6 tablet 0     Sig: TAKE 2 TABLETS BY MOUTH FOR 1 DAY THEN TAKE 1 TABLET BY MOUTH DAILY FOR 4 DAYS         For Pharmacy Admin Tracking Only    Program: Medication Refill  CPA in place:    Recommendation Provided To:   Intervention Detail: New Rx: 1, reason: Patient Preference  Intervention Accepted By:   Gap Closed?:    Time Spent (min): 5

## 2024-08-15 RX ORDER — AZITHROMYCIN 250 MG/1
TABLET, FILM COATED ORAL
Qty: 6 TABLET | Refills: 0 | Status: SHIPPED | OUTPATIENT
Start: 2024-08-15

## 2024-09-05 DIAGNOSIS — G99.2 STENOSIS OF CERVICAL SPINE WITH MYELOPATHY (HCC): ICD-10-CM

## 2024-09-05 DIAGNOSIS — L23.7 CONTACT DERMATITIS DUE TO POISON IVY: ICD-10-CM

## 2024-09-05 DIAGNOSIS — K02.9 DENTAL CAVITIES: ICD-10-CM

## 2024-09-05 DIAGNOSIS — G89.4 CHRONIC PAIN SYNDROME: ICD-10-CM

## 2024-09-05 DIAGNOSIS — M79.602 ARM PAIN, ANTERIOR, LEFT: ICD-10-CM

## 2024-09-05 DIAGNOSIS — M48.02 STENOSIS OF CERVICAL SPINE WITH MYELOPATHY (HCC): ICD-10-CM

## 2024-09-10 RX ORDER — AZITHROMYCIN 250 MG/1
TABLET, FILM COATED ORAL
Qty: 6 TABLET | Refills: 0 | Status: SHIPPED | OUTPATIENT
Start: 2024-09-10

## 2024-10-01 DIAGNOSIS — L23.7 CONTACT DERMATITIS DUE TO POISON IVY: ICD-10-CM

## 2024-10-01 DIAGNOSIS — G89.4 CHRONIC PAIN SYNDROME: ICD-10-CM

## 2024-10-01 DIAGNOSIS — K02.9 DENTAL CAVITIES: ICD-10-CM

## 2024-10-01 DIAGNOSIS — G99.2 STENOSIS OF CERVICAL SPINE WITH MYELOPATHY (HCC): ICD-10-CM

## 2024-10-01 DIAGNOSIS — M48.02 STENOSIS OF CERVICAL SPINE WITH MYELOPATHY (HCC): ICD-10-CM

## 2024-10-01 DIAGNOSIS — M79.602 ARM PAIN, ANTERIOR, LEFT: ICD-10-CM

## 2024-10-02 DIAGNOSIS — K40.90 RIGHT GROIN HERNIA: ICD-10-CM

## 2024-10-02 DIAGNOSIS — G89.4 CHRONIC PAIN SYNDROME: ICD-10-CM

## 2024-10-02 RX ORDER — MELOXICAM 15 MG/1
15 TABLET ORAL DAILY
Qty: 30 TABLET | Refills: 5 | OUTPATIENT
Start: 2024-10-02

## 2024-10-02 NOTE — TELEPHONE ENCOUNTER
Mobic was sent on 7/24/24 for #30 with 5 refills    For Pharmacy Admin Tracking Only    Program: Medication Refill  CPA in place:    Recommendation Provided To:   Intervention Detail: Discontinued Rx: 1, reason: Duplicate Therapy  Intervention Accepted By:   Gap Closed?:    Time Spent (min): 5

## 2024-10-02 NOTE — TELEPHONE ENCOUNTER
Last appointment: 5/16/24  Next appointment: none  Previous refill encounter(s): 9/10/24    Requested Prescriptions     Pending Prescriptions Disp Refills    azithromycin (ZITHROMAX) 250 MG tablet [Pharmacy Med Name: AZITHROMYCIN 250MG TABLETS 6-MARCY] 6 tablet 0     Sig: TAKE 2 TABLETS BY MOUTH FOR 1 DAY THEN TAKE 1 TABLET BY MOUTH DAILY FOR 4 DAYS         For Pharmacy Admin Tracking Only    Program: Medication Refill  CPA in place:    Recommendation Provided To:   Intervention Detail: New Rx: 1, reason: Patient Preference  Intervention Accepted By:   Gap Closed?:    Time Spent (min): 5

## 2024-10-03 RX ORDER — AZITHROMYCIN 250 MG/1
TABLET, FILM COATED ORAL
Qty: 6 TABLET | Refills: 0 | Status: SHIPPED | OUTPATIENT
Start: 2024-10-03

## 2024-10-15 ENCOUNTER — TELEMEDICINE (OUTPATIENT)
Age: 54
End: 2024-10-15
Payer: MEDICARE

## 2024-10-15 DIAGNOSIS — L23.7 CONTACT DERMATITIS DUE TO POISON IVY: ICD-10-CM

## 2024-10-15 DIAGNOSIS — G99.2 STENOSIS OF CERVICAL SPINE WITH MYELOPATHY (HCC): Primary | ICD-10-CM

## 2024-10-15 DIAGNOSIS — M48.02 STENOSIS OF CERVICAL SPINE WITH MYELOPATHY (HCC): Primary | ICD-10-CM

## 2024-10-15 DIAGNOSIS — K02.9 DENTAL CAVITIES: ICD-10-CM

## 2024-10-15 DIAGNOSIS — K43.9 VENTRAL HERNIA WITHOUT OBSTRUCTION OR GANGRENE: ICD-10-CM

## 2024-10-15 DIAGNOSIS — K40.90 RIGHT GROIN HERNIA: ICD-10-CM

## 2024-10-15 DIAGNOSIS — M79.602 ARM PAIN, ANTERIOR, LEFT: ICD-10-CM

## 2024-10-15 DIAGNOSIS — G89.4 CHRONIC PAIN SYNDROME: ICD-10-CM

## 2024-10-15 PROCEDURE — 99213 OFFICE O/P EST LOW 20 MIN: CPT | Performed by: FAMILY MEDICINE

## 2024-10-15 RX ORDER — MELOXICAM 15 MG/1
15 TABLET ORAL DAILY
Qty: 30 TABLET | Refills: 5 | Status: SHIPPED | OUTPATIENT
Start: 2024-10-15

## 2024-10-15 RX ORDER — AZITHROMYCIN 250 MG/1
TABLET, FILM COATED ORAL
Qty: 6 TABLET | Refills: 0 | Status: SHIPPED | OUTPATIENT
Start: 2024-10-15 | End: 2024-10-25

## 2024-10-15 RX ORDER — BACLOFEN 10 MG/1
10 TABLET ORAL 2 TIMES DAILY PRN
Qty: 40 TABLET | Refills: 1 | Status: SHIPPED | OUTPATIENT
Start: 2024-10-15

## 2024-10-15 SDOH — ECONOMIC STABILITY: FOOD INSECURITY: WITHIN THE PAST 12 MONTHS, YOU WORRIED THAT YOUR FOOD WOULD RUN OUT BEFORE YOU GOT MONEY TO BUY MORE.: NEVER TRUE

## 2024-10-15 SDOH — ECONOMIC STABILITY: FOOD INSECURITY: WITHIN THE PAST 12 MONTHS, THE FOOD YOU BOUGHT JUST DIDN'T LAST AND YOU DIDN'T HAVE MONEY TO GET MORE.: NEVER TRUE

## 2024-10-15 SDOH — ECONOMIC STABILITY: INCOME INSECURITY: HOW HARD IS IT FOR YOU TO PAY FOR THE VERY BASICS LIKE FOOD, HOUSING, MEDICAL CARE, AND HEATING?: NOT HARD AT ALL

## 2024-10-15 ASSESSMENT — PATIENT HEALTH QUESTIONNAIRE - PHQ9
SUM OF ALL RESPONSES TO PHQ9 QUESTIONS 1 & 2: 0
SUM OF ALL RESPONSES TO PHQ QUESTIONS 1-9: 0
SUM OF ALL RESPONSES TO PHQ QUESTIONS 1-9: 0
2. FEELING DOWN, DEPRESSED OR HOPELESS: NOT AT ALL
1. LITTLE INTEREST OR PLEASURE IN DOING THINGS: NOT AT ALL
SUM OF ALL RESPONSES TO PHQ QUESTIONS 1-9: 0
SUM OF ALL RESPONSES TO PHQ QUESTIONS 1-9: 0

## 2024-10-15 NOTE — PROGRESS NOTES
Chief Complaint   Patient presents with    Medication Refill       \"Have you been to the ER, urgent care clinic since your last visit?  Hospitalized since your last visit?\"    NO    “Have you seen or consulted any other health care providers outside of [facility] since your last visit?”    NO        “Have you had a colorectal cancer screening such as a colonoscopy/FIT/Cologuard?    NO        The patient, Rico Padilla, identity was verified by name and .

## 2024-10-15 NOTE — ASSESSMENT & PLAN NOTE
Orders:    meloxicam (MOBIC) 15 MG tablet; Take 1 tablet by mouth daily    baclofen (LIORESAL) 10 MG tablet; Take 1 tablet by mouth 2 times daily as needed (spasm)    [facility] - Benoit Day MD, General Surgery, [place]    azithromycin (ZITHROMAX) 250 MG tablet; 500mg on day 1 followed by 250mg on days 2 - 5

## 2024-10-15 NOTE — ASSESSMENT & PLAN NOTE
Orders:    meloxicam (MOBIC) 15 MG tablet; Take 1 tablet by mouth daily    baclofen (LIORESAL) 10 MG tablet; Take 1 tablet by mouth 2 times daily as needed (spasm)    [facility] - Benoit Day MD, General Surgery, [place]    azithromycin (ZITHROMAX) 250 MG tablet; 500mg on day 1 followed by 250mg on days 2 - 5

## 2024-10-17 ENCOUNTER — TELEPHONE (OUTPATIENT)
Age: 54
End: 2024-10-17

## 2024-10-17 NOTE — TELEPHONE ENCOUNTER
Called patient in regards to a referral. Patient did not answer, voicemail was full, could not leave message.

## 2024-10-25 ENCOUNTER — OFFICE VISIT (OUTPATIENT)
Age: 54
End: 2024-10-25
Payer: MEDICARE

## 2024-10-25 ENCOUNTER — TELEPHONE (OUTPATIENT)
Age: 54
End: 2024-10-25

## 2024-10-25 ENCOUNTER — PREP FOR PROCEDURE (OUTPATIENT)
Age: 54
End: 2024-10-25

## 2024-10-25 VITALS
DIASTOLIC BLOOD PRESSURE: 73 MMHG | TEMPERATURE: 98 F | HEART RATE: 70 BPM | WEIGHT: 217.6 LBS | HEIGHT: 72 IN | BODY MASS INDEX: 29.47 KG/M2 | RESPIRATION RATE: 18 BRPM | SYSTOLIC BLOOD PRESSURE: 117 MMHG | OXYGEN SATURATION: 97 %

## 2024-10-25 DIAGNOSIS — K40.90 RIGHT INGUINAL HERNIA: ICD-10-CM

## 2024-10-25 DIAGNOSIS — K40.90 NON-RECURRENT UNILATERAL INGUINAL HERNIA WITHOUT OBSTRUCTION OR GANGRENE: Primary | ICD-10-CM

## 2024-10-25 PROCEDURE — 4004F PT TOBACCO SCREEN RCVD TLK: CPT | Performed by: SURGERY

## 2024-10-25 PROCEDURE — 3017F COLORECTAL CA SCREEN DOC REV: CPT | Performed by: SURGERY

## 2024-10-25 PROCEDURE — G8427 DOCREV CUR MEDS BY ELIG CLIN: HCPCS | Performed by: SURGERY

## 2024-10-25 PROCEDURE — G8484 FLU IMMUNIZE NO ADMIN: HCPCS | Performed by: SURGERY

## 2024-10-25 PROCEDURE — 99204 OFFICE O/P NEW MOD 45 MIN: CPT | Performed by: SURGERY

## 2024-10-25 PROCEDURE — G8419 CALC BMI OUT NRM PARAM NOF/U: HCPCS | Performed by: SURGERY

## 2024-10-25 RX ORDER — SODIUM CHLORIDE 0.9 % (FLUSH) 0.9 %
5-40 SYRINGE (ML) INJECTION EVERY 12 HOURS SCHEDULED
OUTPATIENT
Start: 2024-10-25

## 2024-10-25 RX ORDER — SODIUM CHLORIDE 0.9 % (FLUSH) 0.9 %
5-40 SYRINGE (ML) INJECTION PRN
OUTPATIENT
Start: 2024-10-25

## 2024-10-25 RX ORDER — SODIUM CHLORIDE 9 MG/ML
INJECTION, SOLUTION INTRAVENOUS PRN
OUTPATIENT
Start: 2024-10-25

## 2024-10-25 ASSESSMENT — PATIENT HEALTH QUESTIONNAIRE - PHQ9
SUM OF ALL RESPONSES TO PHQ9 QUESTIONS 1 & 2: 0
SUM OF ALL RESPONSES TO PHQ QUESTIONS 1-9: 0
SUM OF ALL RESPONSES TO PHQ QUESTIONS 1-9: 0
2. FEELING DOWN, DEPRESSED OR HOPELESS: NOT AT ALL
SUM OF ALL RESPONSES TO PHQ QUESTIONS 1-9: 0
SUM OF ALL RESPONSES TO PHQ QUESTIONS 1-9: 0
1. LITTLE INTEREST OR PLEASURE IN DOING THINGS: NOT AT ALL

## 2024-10-25 NOTE — PROGRESS NOTES
Surgery Progress Note    10/25/2024    had concerns including New Patient (Referred for an evaluation of hernia has noticed it for at least 2 years) and Hernia (Possible hernia rt groin feels pressure/pulling in the area).     Subjective:     Patient is a 53 y.o. male who reports that for several months he has been having some pain in the right groin area, accompanied by some pressure.  He denies seeing an obvious bulge that he can push back.  He reports the pain and discomfort is mainly associated with straining to have a bowel movement.  Denies any nausea, vomiting.  Denies any symptoms on the left side.  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  He reports the pain is worse when he has not been to the bathroom to have a bowel movement in a few days.  The pain sometimes may radiate to the right side to the lower back.  After bowel movement feels better.  He states that sometimes he takes magnesium citrate.      Constitutional: No fever or chills  Neurologic: No headache  Eyes: No scleral icterus or irritated eyes  Nose: No nasal pain or drainage  Mouth: No oral lesions or sore throat  Cardiac: No palpations or chest pain  Pulmonary: No cough or shortness of breath  Gastrointestinal: No nausea, emesis, diarrhea, or constipation  Genitourinary: No dysuria  Musculoskeletal: No muscle or joint tenderness  Skin: No rashes or lesions  Psychiatric: No anxiety or depressed mood    Current Outpatient Medications   Medication Instructions    albuterol sulfate HFA (PROVENTIL;VENTOLIN;PROAIR) 108 (90 Base) MCG/ACT inhaler INHALE 1 PUFF BY MOUTH EVERY 4 HOURS AS NEEDED FOR WHEEZING    ammonium lactate (AMLACTIN) 12 % cream APPLY TO THE AFFECTED AREAS ON THE BODY TWICE DAILY    aspirin 81 mg, DAILY    azithromycin (ZITHROMAX) 250 MG tablet TAKE 2 TABLETS BY MOUTH FOR 1 DAY THEN TAKE 1 TABLET BY MOUTH DAILY FOR 4 DAYS    azithromycin (ZITHROMAX) 250 MG tablet

## 2024-10-25 NOTE — PROGRESS NOTES
Identified pt with two pt identifiers (name and ). Reviewed chart in preparation for visit and have obtained necessary documentation.    Rico Padilla is a 53 y.o. male  Chief Complaint   Patient presents with    New Patient     Referred for an evaluation of hernia has noticed it for at least 2 years    Hernia     Possible hernia rt groin feels pressure/pulling in the area     /73 (Site: Left Upper Arm, Position: Sitting, Cuff Size: Large Adult)   Pulse 70   Temp 98 °F (36.7 °C) (Oral)   Resp 18   Ht 1.829 m (6')   Wt 98.7 kg (217 lb 9.6 oz)   SpO2 97%   BMI 29.51 kg/m²     1. Have you been to the ER, urgent care clinic since your last visit?  Hospitalized since your last visit?no    2. Have you seen or consulted any other health care providers outside of the [facility] since your last visit?  Include any pap smears or colon screening. no

## 2024-10-25 NOTE — TELEPHONE ENCOUNTER
Called and spoke to pt about scheduling procedure with Dr. Pittman on 11/4. Surgical letter will be sent to patient.

## 2024-11-01 NOTE — PERIOP NOTE
[facility]                   [address] [place] [address]   MAIN OR                                  [phone]   MAIN PRE OP                          [phone]                                                                                AMBULATORY PRE OP          [phone]  PRE-ADMISSION TESTING    [phone]   Surgery Date:  11/4/24       Is surgery arrival time given by surgeon?  YES  NO  If “NO”, [facility] staff will call you between 3 and 7pm the day before your surgery with your arrival time. (If your surgery is on a Monday, we will call you the Friday before.)    Call [phone] after 7pm Monday-Friday if you did not receive this call.    INSTRUCTIONS BEFORE YOUR SURGERY   When You  Arrive Arrive at the 2nd Floor Admitting Desk on the day of your surgery  Have your insurance card, photo ID, and any copayment (if needed)   Food   and   Drink No food or drink (gum , mints, coffee, juice, etc)after midnight the night before surgery.   You may drink WATER ONLY up until 2 hours prior to your surgery time. Please do not add anything to your water as this may result in your surgery being postponed.    No alcohol (beer, wine, liquor) 24 hours before and after surgery   Medications to   TAKE   Morning of Surgery MEDICATIONS TO TAKE THE MORNING OF SURGERY WITH A SIP OF WATER:      Medications  To  STOP      7 days before surgery Non-Steroidal anti-inflammatory Drugs (NSAID's): for example, Ibuprofen (Advil, Motrin), Naproxen (Aleve)  Aspirin, if taking for pain   Herbal supplements, vitamins, and fish oil  Other:  (Pain medications not listed above, including Tylenol may be taken)   Blood  Thinners If you take  Aspirin, Plavix, Coumadin, or any blood-thinning or anti-blood clot medicine, talk to the doctor who prescribed the medications for pre-operative instructions.   Bathing Clothing  Jewelry  Valuables     If you shower the morning of surgery, please do not

## 2024-11-04 ENCOUNTER — ANESTHESIA (OUTPATIENT)
Facility: HOSPITAL | Age: 54
End: 2024-11-04
Payer: MEDICARE

## 2024-11-04 ENCOUNTER — HOSPITAL ENCOUNTER (OUTPATIENT)
Facility: HOSPITAL | Age: 54
Setting detail: OUTPATIENT SURGERY
Discharge: HOME | End: 2024-11-04
Attending: SURGERY | Admitting: SURGERY
Payer: MEDICARE

## 2024-11-04 ENCOUNTER — ANESTHESIA EVENT (OUTPATIENT)
Facility: HOSPITAL | Age: 54
End: 2024-11-04
Payer: MEDICARE

## 2024-11-04 VITALS
HEART RATE: 61 BPM | DIASTOLIC BLOOD PRESSURE: 78 MMHG | BODY MASS INDEX: 29.41 KG/M2 | TEMPERATURE: 97.9 F | RESPIRATION RATE: 14 BRPM | WEIGHT: 217.15 LBS | OXYGEN SATURATION: 94 % | HEIGHT: 72 IN | SYSTOLIC BLOOD PRESSURE: 132 MMHG

## 2024-11-04 DIAGNOSIS — K40.90 RIGHT INGUINAL HERNIA: Primary | ICD-10-CM

## 2024-11-04 PROCEDURE — 6360000002 HC RX W HCPCS: Performed by: NURSE ANESTHETIST, CERTIFIED REGISTERED

## 2024-11-04 PROCEDURE — 6360000002 HC RX W HCPCS: Performed by: STUDENT IN AN ORGANIZED HEALTH CARE EDUCATION/TRAINING PROGRAM

## 2024-11-04 PROCEDURE — 2580000003 HC RX 258: Performed by: SURGERY

## 2024-11-04 PROCEDURE — S2900 ROBOTIC SURGICAL SYSTEM: HCPCS | Performed by: SURGERY

## 2024-11-04 PROCEDURE — 2500000003 HC RX 250 WO HCPCS: Performed by: NURSE ANESTHETIST, CERTIFIED REGISTERED

## 2024-11-04 PROCEDURE — 3600000009 HC SURGERY ROBOT BASE: Performed by: SURGERY

## 2024-11-04 PROCEDURE — C1781 MESH (IMPLANTABLE): HCPCS | Performed by: SURGERY

## 2024-11-04 PROCEDURE — 3700000001 HC ADD 15 MINUTES (ANESTHESIA): Performed by: SURGERY

## 2024-11-04 PROCEDURE — 3600000019 HC SURGERY ROBOT ADDTL 15MIN: Performed by: SURGERY

## 2024-11-04 PROCEDURE — 7100000001 HC PACU RECOVERY - ADDTL 15 MIN: Performed by: SURGERY

## 2024-11-04 PROCEDURE — 6360000002 HC RX W HCPCS: Performed by: SURGERY

## 2024-11-04 PROCEDURE — 6370000000 HC RX 637 (ALT 250 FOR IP): Performed by: NURSE ANESTHETIST, CERTIFIED REGISTERED

## 2024-11-04 PROCEDURE — 7100000010 HC PHASE II RECOVERY - FIRST 15 MIN: Performed by: SURGERY

## 2024-11-04 PROCEDURE — 7100000000 HC PACU RECOVERY - FIRST 15 MIN: Performed by: SURGERY

## 2024-11-04 PROCEDURE — 7100000011 HC PHASE II RECOVERY - ADDTL 15 MIN: Performed by: SURGERY

## 2024-11-04 PROCEDURE — 3700000000 HC ANESTHESIA ATTENDED CARE: Performed by: SURGERY

## 2024-11-04 PROCEDURE — 2709999900 HC NON-CHARGEABLE SUPPLY: Performed by: SURGERY

## 2024-11-04 PROCEDURE — 2580000003 HC RX 258: Performed by: STUDENT IN AN ORGANIZED HEALTH CARE EDUCATION/TRAINING PROGRAM

## 2024-11-04 DEVICE — LAPAROSCOPIC SELF-FIXATING MESH, RIGHT ANATOMICAL
Type: IMPLANTABLE DEVICE | Site: INGUINAL | Status: FUNCTIONAL
Brand: PROGRIP

## 2024-11-04 RX ORDER — PROPOFOL 10 MG/ML
INJECTION, EMULSION INTRAVENOUS
Status: DISCONTINUED | OUTPATIENT
Start: 2024-11-04 | End: 2024-11-04 | Stop reason: SDUPTHER

## 2024-11-04 RX ORDER — DEXAMETHASONE SODIUM PHOSPHATE 4 MG/ML
INJECTION, SOLUTION INTRA-ARTICULAR; INTRALESIONAL; INTRAMUSCULAR; INTRAVENOUS; SOFT TISSUE
Status: DISCONTINUED | OUTPATIENT
Start: 2024-11-04 | End: 2024-11-04 | Stop reason: SDUPTHER

## 2024-11-04 RX ORDER — MIDAZOLAM HYDROCHLORIDE 2 MG/2ML
2 INJECTION, SOLUTION INTRAMUSCULAR; INTRAVENOUS
Status: COMPLETED | OUTPATIENT
Start: 2024-11-04 | End: 2024-11-04

## 2024-11-04 RX ORDER — LIDOCAINE HYDROCHLORIDE 10 MG/ML
1 INJECTION, SOLUTION EPIDURAL; INFILTRATION; INTRACAUDAL; PERINEURAL
Status: DISCONTINUED | OUTPATIENT
Start: 2024-11-04 | End: 2024-11-04 | Stop reason: HOSPADM

## 2024-11-04 RX ORDER — SODIUM CHLORIDE, SODIUM LACTATE, POTASSIUM CHLORIDE, CALCIUM CHLORIDE 600; 310; 30; 20 MG/100ML; MG/100ML; MG/100ML; MG/100ML
INJECTION, SOLUTION INTRAVENOUS CONTINUOUS
Status: DISCONTINUED | OUTPATIENT
Start: 2024-11-04 | End: 2024-11-04 | Stop reason: HOSPADM

## 2024-11-04 RX ORDER — ONDANSETRON 2 MG/ML
INJECTION INTRAMUSCULAR; INTRAVENOUS
Status: DISCONTINUED | OUTPATIENT
Start: 2024-11-04 | End: 2024-11-04 | Stop reason: SDUPTHER

## 2024-11-04 RX ORDER — ROCURONIUM BROMIDE 10 MG/ML
INJECTION, SOLUTION INTRAVENOUS
Status: DISCONTINUED | OUTPATIENT
Start: 2024-11-04 | End: 2024-11-04 | Stop reason: SDUPTHER

## 2024-11-04 RX ORDER — SODIUM CHLORIDE 9 MG/ML
INJECTION, SOLUTION INTRAVENOUS PRN
Status: DISCONTINUED | OUTPATIENT
Start: 2024-11-04 | End: 2024-11-04 | Stop reason: HOSPADM

## 2024-11-04 RX ORDER — DIPHENHYDRAMINE HYDROCHLORIDE 50 MG/ML
12.5 INJECTION INTRAMUSCULAR; INTRAVENOUS
Status: DISCONTINUED | OUTPATIENT
Start: 2024-11-04 | End: 2024-11-04 | Stop reason: HOSPADM

## 2024-11-04 RX ORDER — FENTANYL CITRATE 50 UG/ML
100 INJECTION, SOLUTION INTRAMUSCULAR; INTRAVENOUS
Status: COMPLETED | OUTPATIENT
Start: 2024-11-04 | End: 2024-11-04

## 2024-11-04 RX ORDER — ONDANSETRON 2 MG/ML
4 INJECTION INTRAMUSCULAR; INTRAVENOUS
Status: DISCONTINUED | OUTPATIENT
Start: 2024-11-04 | End: 2024-11-04 | Stop reason: HOSPADM

## 2024-11-04 RX ORDER — BUPIVACAINE HYDROCHLORIDE 5 MG/ML
INJECTION, SOLUTION PERINEURAL PRN
Status: DISCONTINUED | OUTPATIENT
Start: 2024-11-04 | End: 2024-11-04 | Stop reason: ALTCHOICE

## 2024-11-04 RX ORDER — OXYCODONE HYDROCHLORIDE 5 MG/1
5 TABLET ORAL EVERY 6 HOURS PRN
Qty: 12 TABLET | Refills: 0 | Status: SHIPPED | OUTPATIENT
Start: 2024-11-04 | End: 2024-11-07

## 2024-11-04 RX ORDER — SODIUM CHLORIDE 0.9 % (FLUSH) 0.9 %
5-40 SYRINGE (ML) INJECTION EVERY 12 HOURS SCHEDULED
Status: DISCONTINUED | OUTPATIENT
Start: 2024-11-04 | End: 2024-11-04 | Stop reason: HOSPADM

## 2024-11-04 RX ORDER — ALBUTEROL SULFATE 90 UG/1
INHALANT RESPIRATORY (INHALATION)
Status: DISCONTINUED | OUTPATIENT
Start: 2024-11-04 | End: 2024-11-04 | Stop reason: SDUPTHER

## 2024-11-04 RX ORDER — NALOXONE HYDROCHLORIDE 0.4 MG/ML
INJECTION, SOLUTION INTRAMUSCULAR; INTRAVENOUS; SUBCUTANEOUS PRN
Status: DISCONTINUED | OUTPATIENT
Start: 2024-11-04 | End: 2024-11-04 | Stop reason: HOSPADM

## 2024-11-04 RX ORDER — SODIUM CHLORIDE 0.9 % (FLUSH) 0.9 %
5-40 SYRINGE (ML) INJECTION PRN
Status: DISCONTINUED | OUTPATIENT
Start: 2024-11-04 | End: 2024-11-04 | Stop reason: HOSPADM

## 2024-11-04 RX ORDER — SUCCINYLCHOLINE CHLORIDE 20 MG/ML
INJECTION INTRAMUSCULAR; INTRAVENOUS
Status: DISCONTINUED | OUTPATIENT
Start: 2024-11-04 | End: 2024-11-04 | Stop reason: SDUPTHER

## 2024-11-04 RX ORDER — LIDOCAINE HYDROCHLORIDE 20 MG/ML
INJECTION, SOLUTION EPIDURAL; INFILTRATION; INTRACAUDAL; PERINEURAL
Status: DISCONTINUED | OUTPATIENT
Start: 2024-11-04 | End: 2024-11-04 | Stop reason: SDUPTHER

## 2024-11-04 RX ADMIN — ALBUTEROL SULFATE 8 PUFF: 90 AEROSOL, METERED RESPIRATORY (INHALATION) at 12:50

## 2024-11-04 RX ADMIN — LIDOCAINE HYDROCHLORIDE 40 MG: 20 INJECTION, SOLUTION EPIDURAL; INFILTRATION; INTRACAUDAL; PERINEURAL at 12:45

## 2024-11-04 RX ADMIN — PROPOFOL 200 MG: 10 INJECTION, EMULSION INTRAVENOUS at 12:45

## 2024-11-04 RX ADMIN — FENTANYL CITRATE 100 MCG: 50 INJECTION, SOLUTION INTRAMUSCULAR; INTRAVENOUS at 13:11

## 2024-11-04 RX ADMIN — HYDROMORPHONE HYDROCHLORIDE 0.5 MG: 1 INJECTION, SOLUTION INTRAMUSCULAR; INTRAVENOUS; SUBCUTANEOUS at 14:26

## 2024-11-04 RX ADMIN — DEXAMETHASONE SODIUM PHOSPHATE 4 MG: 4 INJECTION, SOLUTION INTRAMUSCULAR; INTRAVENOUS at 13:20

## 2024-11-04 RX ADMIN — ONDANSETRON HYDROCHLORIDE 4 MG: 2 SOLUTION INTRAMUSCULAR; INTRAVENOUS at 12:36

## 2024-11-04 RX ADMIN — SUGAMMADEX 200 MG: 100 INJECTION, SOLUTION INTRAVENOUS at 13:51

## 2024-11-04 RX ADMIN — WATER 2000 MG: 1 INJECTION INTRAMUSCULAR; INTRAVENOUS; SUBCUTANEOUS at 13:06

## 2024-11-04 RX ADMIN — ROCURONIUM BROMIDE 10 MG: 10 INJECTION INTRAVENOUS at 12:46

## 2024-11-04 RX ADMIN — SUCCINYLCHOLINE CHLORIDE 160 MG: 20 INJECTION, SOLUTION INTRAMUSCULAR; INTRAVENOUS at 12:46

## 2024-11-04 RX ADMIN — SODIUM CHLORIDE, POTASSIUM CHLORIDE, SODIUM LACTATE AND CALCIUM CHLORIDE: 600; 310; 30; 20 INJECTION, SOLUTION INTRAVENOUS at 11:04

## 2024-11-04 RX ADMIN — FENTANYL CITRATE 100 MCG: 50 INJECTION, SOLUTION INTRAMUSCULAR; INTRAVENOUS at 12:45

## 2024-11-04 RX ADMIN — LIDOCAINE HYDROCHLORIDE 140 MG: 20 INJECTION, SOLUTION EPIDURAL; INFILTRATION; INTRACAUDAL; PERINEURAL at 13:14

## 2024-11-04 RX ADMIN — MIDAZOLAM HYDROCHLORIDE 2 MG: 1 INJECTION, SOLUTION INTRAMUSCULAR; INTRAVENOUS at 12:37

## 2024-11-04 RX ADMIN — MIDAZOLAM HYDROCHLORIDE 3 MG: 1 INJECTION, SOLUTION INTRAMUSCULAR; INTRAVENOUS at 12:35

## 2024-11-04 RX ADMIN — SUGAMMADEX 200 MG: 100 INJECTION, SOLUTION INTRAVENOUS at 13:44

## 2024-11-04 ASSESSMENT — PAIN SCALES - GENERAL
PAINLEVEL_OUTOF10: 7
PAINLEVEL_OUTOF10: 3
PAINLEVEL_OUTOF10: 7

## 2024-11-04 ASSESSMENT — PAIN DESCRIPTION - ORIENTATION: ORIENTATION: LEFT;RIGHT

## 2024-11-04 ASSESSMENT — PAIN DESCRIPTION - DESCRIPTORS
DESCRIPTORS: BURNING
DESCRIPTORS: TIGHTNESS

## 2024-11-04 ASSESSMENT — PAIN - FUNCTIONAL ASSESSMENT: PAIN_FUNCTIONAL_ASSESSMENT: 0-10

## 2024-11-04 ASSESSMENT — PAIN DESCRIPTION - LOCATION
LOCATION: FOOT
LOCATION: ABDOMEN

## 2024-11-04 NOTE — ANESTHESIA POSTPROCEDURE EVALUATION
Department of Anesthesiology  Postprocedure Note    Patient: Rico Padilla  MRN: [id number]  YOB: 1970  Date of evaluation: 11/4/2024    Procedure Summary       Date: 11/04/24 Room / Location: [facility] MAIN OR F5 / [facility] MAIN OR    Anesthesia Start: 1235 Anesthesia Stop: 1409    Procedure: ROBOTIC REPAIR RIGHT INGUINAL HERNIA WITH MESH (Right: Abdomen) Diagnosis:       Right inguinal hernia      (Right inguinal hernia [K40.90])    Surgeons: Ira Pittman MD Responsible Provider: Brett Porter MD    Anesthesia Type: General ASA Status: 3            Anesthesia Type: General    Casper Phase I: Casper Score: 8    Casper Phase II:      Anesthesia Post Evaluation    Patient location during evaluation: PACU  Patient participation: complete - patient participated  Level of consciousness: awake  Airway patency: patent  Nausea & Vomiting: no vomiting and no nausea  Cardiovascular status: hemodynamically stable  Respiratory status: acceptable  Hydration status: stable  Pain management: adequate    No notable events documented.

## 2024-11-04 NOTE — OP NOTE
OPERATIVE REPORT     PREOPERATIVE DIAGNOSIS: Reducible right inguinal hernia.     POSTOPERATIVE DIAGNOSIS: Reducible right inguinal hernia.     PROCEDURES PERFORMED: Laparoscopic robotic assisted repair of right inguinal hernia (transabdominal pre-peritoneal) .     SURGEON: Ira Pittman MD     ANESTHESIA: General, ET    INDICATION: As documented in history and physical.     FINDINGS: direct inguinal hernia and lipoma of the cord    ASSISTANT: None       DESCRIPTION OF PROCEDURE:   The patient was taken to the operating room and   placed in supine position. Following general anesthetic induction and   endotracheal intubation, his abdomen was prepped and draped in the   usual sterile fashion.   A supraumbilical incision was made. A Veress needle was passed without difficulty. The initial intraabdominal pressures were low, and the abdomen was insufflated   with carbon dioxide gas to a pressure of 15 mmHg. An 8.5 mm robotic trocar   was inserted. An 8.5 mm robotic laparoscope was introduced. The   abdomen was then surveyed. There was no evidence of inadvertent   intraabdominal injury.We then placed an 8 mm robotic trocar in the right upper quadrant and another 8 mm robotic trocar in the left upper quadrant under direct visulaization. The patient was placed in Trendelenburg.   The robot was then brought in and docked.  Once the robot was docked, laparoscope was mounted.   For my right hand, we placed the monopolar scissors. For my left hand, the   Cadiere grasper was used. I then went to the surgeon's console. Inspection   of the right side identified a direct hernia. The peritoneal flap was then created with the monopolar   scissors. This was done from approximately the level of the    anterior-superior iliac spine on the right side, extending the incision   medially beyond the midline. The peritoneal flap was then   developed using a combination of blunt dissection and the monopolar scissors.   First, the usual

## 2024-11-04 NOTE — BRIEF OP NOTE
Brief Postoperative Note      Patient: Rico Padilla  YOB: 1970  MRN: [id number]    Date of Procedure: 11/4/2024    Pre-Op Diagnosis Codes:      * Right inguinal hernia [K40.90]    Post-Op Diagnosis: Same       Procedure(s):  ROBOTIC REPAIR RIGHT INGUINAL HERNIA WITH MESH    Surgeon(s):  Ira Pittman MD    Assistant:  Surgical Assistant: Shaniqua Randhawa    Anesthesia: General    Estimated Blood Loss (mL): Minimal    Complications: None    Specimens:   none    Implants:  Implant Name Type Inv. Item Serial No.  Lot No. LRB No. Used Action   MESH ANUSHA [id number] TEXTILE MFIL POLYETH TEREPHTHALATE - SNA  MESH ANUSHA [id number] TEXTILE MFIL POLYETH TEREPHTHALATE NA [employer]  SURGICAL-WD [id number] Right 1 Implanted         Drains: none    Findings:  Infection Present At Time Of Surgery (PATOS) (choose all levels that have infection present):  No infection present  Other Findings: right direct inguinal hernia, lipoma of the cord    Electronically signed by Ira Pittman MD on 11/4/2024 at 1:43 PM

## 2024-11-04 NOTE — DISCHARGE INSTRUCTIONS
do to avoid spreading the virus to others?  To help avoid spreading the virus to others:  Cover your mouth with a tissue when you cough or sneeze. Then throw the tissue in the trash.  Use a disinfectant to clean things that you touch often.  Stay home if you are sick or have been exposed to the virus. Don't go to school, work, or public areas. And don't use public transportation.  If you are sick:  Leave your home only if you need to get medical care. But call the doctor's office first so they know you're coming. And wear a face mask, if you have one.  If you have a face mask, wear it whenever you're around other people. It can help stop the spread of the virus when you cough or sneeze.  Clean and disinfect your home every day. Use household  and disinfectant wipes or sprays. Take special care to clean things that you grab with your hands. These include doorknobs, remote controls, phones, and handles on your refrigerator and microwave. And don't forget countertops, tabletops, bathrooms, and computer keyboards.  When to call for help  Call 911 anytime you think you may need emergency care. For example, call if:  You have severe trouble breathing. (You can't talk at all.)  You have constant chest pain or pressure.  You are severely dizzy or lightheaded.  You are confused or can't think clearly.  Your face and lips have a blue color.  You pass out (lose consciousness) or are very hard to wake up.  Call your doctor now if you develop symptoms such as:  Shortness of breath.  Fever.  Cough.  If you need to get care, call ahead to the doctor's office for instructions before you go. Make sure you wear a face mask, if you have one, to prevent exposing other people to the virus.  Where can you get the latest information?  The following health organizations are tracking and studying this virus. Their websites contain the most up-to-date information. You'll also learn what to do if you think you may have been exposed to

## 2024-11-04 NOTE — ANESTHESIA PRE PROCEDURE
Department of Anesthesiology  Preprocedure Note       Name:  Rico Padilla   Age:  54 y.o.  :  1970                                          MRN:  [id number]         Date:  2024      Surgeon: Surgeon(s):  Ira Pittman MD    Procedure: Procedure(s):  ROBOTIC REPAIR RIGHT INGUINAL HERNIA WITH MESH    Medications prior to admission:   Prior to Admission medications    Medication Sig Start Date End Date Taking? Authorizing Provider   oxyCODONE (ROXICODONE) 5 MG immediate release tablet Take 1 tablet by mouth every 6 hours as needed for Pain for up to 3 days. Intended supply: 3 days. Take lowest dose possible to manage pain Max Daily Amount: 20 mg 24 Yes Ira Pittman MD   albuterol sulfate HFA (PROVENTIL;VENTOLIN;PROAIR) 108 (90 Base) MCG/ACT inhaler INHALE 1 PUFF BY MOUTH EVERY 4 HOURS AS NEEDED FOR WHEEZING 24  Yes Benitez Fonseca MD   meloxicam (MOBIC) 15 MG tablet Take 1 tablet by mouth daily 10/15/24   Benitez Fonseca MD   baclofen (LIORESAL) 10 MG tablet Take 1 tablet by mouth 2 times daily as needed (spasm) 10/15/24   Benitez Fonseca MD   azithromycin (ZITHROMAX) 250 MG tablet TAKE 2 TABLETS BY MOUTH FOR 1 DAY THEN TAKE 1 TABLET BY MOUTH DAILY FOR 4 DAYS  Patient not taking: Reported on 10/25/2024 10/3/24   Benitez Fonseca MD   methylPREDNISolone (MEDROL DOSEPACK) 4 MG tablet Take by mouth.  Patient not taking: Reported on 10/25/2024 5/16/24   Benitez Fonseca MD   methylPREDNISolone (MEDROL DOSEPACK) 4 MG tablet FOLLOW PACKAGE DIRECTIONS  Patient not taking: Reported on 10/25/2024 4/24/24   Benitez Fonseca MD   triamcinolone (KENALOG) 0.1 % cream Apply topically 2 times daily.  Patient not taking: Reported on 10/25/2024 12/13/23   Benitez Fonseca MD   naloxone 4 MG/0.1ML LIQD nasal spray Use 1 spray intranasally, then discard. Repeat with new spray every 2 min as needed for opioid overdose symptoms, alternating nostrils.  Patient not taking: Reported on

## 2024-11-07 ENCOUNTER — TELEPHONE (OUTPATIENT)
Age: 54
End: 2024-11-07

## 2024-11-07 DIAGNOSIS — Z48.89 ENCOUNTER FOR OTHER SPECIFIED SURGICAL AFTERCARE: Primary | ICD-10-CM

## 2024-11-07 RX ORDER — HYDROCODONE BITARTRATE AND ACETAMINOPHEN 5; 325 MG/1; MG/1
1 TABLET ORAL EVERY 8 HOURS PRN
Qty: 6 TABLET | Refills: 0 | Status: SHIPPED | OUTPATIENT
Start: 2024-11-07 | End: 2024-11-09

## 2024-11-07 RX ORDER — HYDROCODONE BITARTRATE AND ACETAMINOPHEN 5; 325 MG/1; MG/1
1 TABLET ORAL EVERY 8 HOURS PRN
Qty: 6 TABLET | Refills: 0 | Status: CANCELLED | OUTPATIENT
Start: 2024-11-07 | End: 2024-11-09

## 2024-11-07 RX ORDER — HYDROCODONE BITARTRATE AND ACETAMINOPHEN 5; 325 MG/1; MG/1
1 TABLET ORAL EVERY 8 HOURS PRN
Qty: 6 TABLET | Refills: 0 | Status: SHIPPED | OUTPATIENT
Start: 2024-11-07 | End: 2024-11-07 | Stop reason: SDUPTHER

## 2024-11-07 NOTE — TELEPHONE ENCOUNTER
Returned call to patient and identified via two patient identifiers.  Patient is three days post-op of robotic repair right inguinal hernia with mesh and is c/o abdominal pain in his right lower quadrant.  He states that it is a cramping pain that comes and goes and when he has the pain he can hear gurgling in his abdomen.  He has not had a bowel movement since the morning of surgery and is only taking metamucil and the hydrocodone for pain.  Recommended patient add colace or milk of magnesia and make sure he is drinking plenty of fluids.  Also advised him he can take Advil or tylenol for the pain as he is out of the hydrocodone.  Patient states he will add milk of magnesia as that has helped with previous post surgical constipation but he is also requesting a refill of hydrocodone.  This nurse advised him Dr. Pittman does not typically provide refills but would reach out to her PA Mr. Stanton to inquire if this would be appropriate as she is out of the office at this time.  Pt understood and was thankful for the call.

## 2024-11-07 NOTE — TELEPHONE ENCOUNTER
Returned call to patient and verified his identity using two patient identifiers.  Relayed Mr. Stanton's instructions.  Patient requested the prescription be resent to the [facility] in [place].  Pharmacy updated in patient's chart and requested Mr. Stanton resend the prescription there.  Pt understood and was thankful for the call.

## 2024-11-07 NOTE — TELEPHONE ENCOUNTER
Patient called to request refill of Oxycodone to be sent to his preferred pharmacy on file. Patient is also stated he is having difficulty with bowel movements/ constipation and he is in a great deal of pain with moving around and possible gas. Patient is requesting a call back to know if this is normal and an update on refill at [phone]

## 2024-11-15 DIAGNOSIS — G89.4 CHRONIC PAIN SYNDROME: ICD-10-CM

## 2024-11-15 DIAGNOSIS — K02.9 DENTAL CAVITIES: ICD-10-CM

## 2024-11-15 DIAGNOSIS — L23.7 CONTACT DERMATITIS DUE TO POISON IVY: ICD-10-CM

## 2024-11-15 DIAGNOSIS — G99.2 STENOSIS OF CERVICAL SPINE WITH MYELOPATHY (HCC): ICD-10-CM

## 2024-11-15 DIAGNOSIS — M79.602 ARM PAIN, ANTERIOR, LEFT: ICD-10-CM

## 2024-11-15 DIAGNOSIS — M48.02 STENOSIS OF CERVICAL SPINE WITH MYELOPATHY (HCC): ICD-10-CM

## 2024-11-18 NOTE — TELEPHONE ENCOUNTER
Last appointment: 10/15/24  Next appointment: none  Previous refill encounter(s): 10/3/24    Requested Prescriptions     Pending Prescriptions Disp Refills    azithromycin (ZITHROMAX) 250 MG tablet [Pharmacy Med Name: AZITHROMYCIN 250MG TABLETS 6-MARCY] 6 tablet 0     Sig: TAKE 2 TABLETS BY MOUTH FOR 1 DAY THEN TAKE 1 TABLET BY MOUTH DAILY FOR 4 DAYS         For Pharmacy Admin Tracking Only    Program: Medication Refill  CPA in place:    Recommendation Provided To:   Intervention Detail: New Rx: 1, reason: Patient Preference  Intervention Accepted By:   Gap Closed?:    Time Spent (min): 5

## 2024-11-21 RX ORDER — AZITHROMYCIN 250 MG/1
TABLET, FILM COATED ORAL
Qty: 6 TABLET | Refills: 0 | Status: SHIPPED | OUTPATIENT
Start: 2024-11-21

## 2025-01-08 DIAGNOSIS — K40.90 RIGHT GROIN HERNIA: ICD-10-CM

## 2025-01-08 DIAGNOSIS — K02.9 DENTAL CAVITIES: ICD-10-CM

## 2025-01-08 DIAGNOSIS — M48.02 STENOSIS OF CERVICAL SPINE WITH MYELOPATHY (HCC): ICD-10-CM

## 2025-01-08 DIAGNOSIS — K43.9 VENTRAL HERNIA WITHOUT OBSTRUCTION OR GANGRENE: ICD-10-CM

## 2025-01-08 DIAGNOSIS — M79.602 ARM PAIN, ANTERIOR, LEFT: ICD-10-CM

## 2025-01-08 DIAGNOSIS — G99.2 STENOSIS OF CERVICAL SPINE WITH MYELOPATHY (HCC): ICD-10-CM

## 2025-01-08 DIAGNOSIS — G89.4 CHRONIC PAIN SYNDROME: ICD-10-CM

## 2025-01-08 DIAGNOSIS — L23.7 CONTACT DERMATITIS DUE TO POISON IVY: ICD-10-CM

## 2025-01-08 NOTE — TELEPHONE ENCOUNTER
Last appointment: 10/15/24  Next appointment: none  Previous refill encounter(s): 11/21/24 Z-marcy #6, 10/15/24 Baclofen #40 with 1 refill    Requested Prescriptions     Pending Prescriptions Disp Refills    baclofen (LIORESAL) 10 MG tablet [Pharmacy Med Name: BACLOFEN 10MG TABLETS] 40 tablet 1     Sig: TAKE 1 TABLET BY MOUTH TWICE DAILY AS NEEDED FOR SPASM    azithromycin (ZITHROMAX) 250 MG tablet [Pharmacy Med Name: AZITHROMYCIN 250MG TABLETS 6-MARCY] 6 tablet 0     Sig: TAKE 2 TABLETS BY MOUTH FOR 1 DAY THEN TAKE 1 TABLET BY MOUTH DAILY FOR 4 DAYS         For Pharmacy Admin Tracking Only    Program: Medication Refill  CPA in place:    Recommendation Provided To:   Intervention Detail: New Rx: 2, reason: Patient Preference  Intervention Accepted By:   Gap Closed?:    Time Spent (min): 5

## 2025-01-12 RX ORDER — BACLOFEN 10 MG/1
TABLET ORAL
Qty: 40 TABLET | Refills: 1 | Status: SHIPPED | OUTPATIENT
Start: 2025-01-12

## 2025-01-12 RX ORDER — AZITHROMYCIN 250 MG/1
TABLET, FILM COATED ORAL
Qty: 6 TABLET | Refills: 0 | Status: SHIPPED | OUTPATIENT
Start: 2025-01-12

## 2025-02-18 NOTE — TELEPHONE ENCOUNTER
Last appointment: 10/15/24  Next appointment: none  Previous refill encounter(s): 7/24/24 #1 with 5 refills    Requested Prescriptions     Pending Prescriptions Disp Refills    albuterol sulfate HFA (PROVENTIL;VENTOLIN;PROAIR) 108 (90 Base) MCG/ACT inhaler [Pharmacy Med Name: ALBUTEROL HFA INH (200 PUFFS) 8.5GM] 8.5 g 5     Sig: INHALE 1 PUFF BY MOUTH EVERY 4 HOURS AS NEEDED FOR WHEEZING         For Pharmacy Admin Tracking Only    Program: Medication Refill  CPA in place:    Recommendation Provided To:   Intervention Detail: New Rx: 1, reason: Patient Preference  Intervention Accepted By:   Gap Closed?:    Time Spent (min): 5

## 2025-02-19 RX ORDER — ALBUTEROL SULFATE 90 UG/1
INHALANT RESPIRATORY (INHALATION)
Qty: 8.5 G | Refills: 5 | Status: SHIPPED | OUTPATIENT
Start: 2025-02-19

## 2025-03-03 DIAGNOSIS — K40.90 RIGHT GROIN HERNIA: ICD-10-CM

## 2025-03-03 DIAGNOSIS — M79.602 ARM PAIN, ANTERIOR, LEFT: ICD-10-CM

## 2025-03-03 DIAGNOSIS — G99.2 STENOSIS OF CERVICAL SPINE WITH MYELOPATHY (HCC): ICD-10-CM

## 2025-03-03 DIAGNOSIS — M48.02 STENOSIS OF CERVICAL SPINE WITH MYELOPATHY (HCC): ICD-10-CM

## 2025-03-03 DIAGNOSIS — G89.4 CHRONIC PAIN SYNDROME: ICD-10-CM

## 2025-03-03 DIAGNOSIS — K02.9 DENTAL CAVITIES: ICD-10-CM

## 2025-03-03 DIAGNOSIS — K43.9 VENTRAL HERNIA WITHOUT OBSTRUCTION OR GANGRENE: ICD-10-CM

## 2025-03-03 DIAGNOSIS — L23.7 CONTACT DERMATITIS DUE TO POISON IVY: ICD-10-CM

## 2025-03-04 NOTE — TELEPHONE ENCOUNTER
Last appointment: 10/15/24  Next appointment: none  Previous refill encounter(s): 1/12/25    Requested Prescriptions     Pending Prescriptions Disp Refills    baclofen (LIORESAL) 10 MG tablet [Pharmacy Med Name: BACLOFEN 10MG TABLETS] 40 tablet 1     Sig: TAKE 1 TABLET BY MOUTH TWICE DAILY AS NEEDED FOR SPASM    azithromycin (ZITHROMAX) 250 MG tablet [Pharmacy Med Name: AZITHROMYCIN 250MG TABLETS 6-MARCY] 6 tablet 0     Sig: TAKE 2 TABLETS BY MOUTH FOR 1 DAY THEN TAKE 1 TABLET BY MOUTH DAILY FOR 4 DAYS         For Pharmacy Admin Tracking Only    Program: Medication Refill  CPA in place:    Recommendation Provided To:   Intervention Detail: New Rx: 2, reason: Patient Preference  Intervention Accepted By:   Gap Closed?:    Time Spent (min): 5

## 2025-03-06 RX ORDER — BACLOFEN 10 MG/1
TABLET ORAL
Qty: 40 TABLET | Refills: 1 | Status: SHIPPED | OUTPATIENT
Start: 2025-03-06

## 2025-03-06 RX ORDER — AZITHROMYCIN 250 MG/1
TABLET, FILM COATED ORAL
Qty: 6 TABLET | Refills: 0 | Status: SHIPPED | OUTPATIENT
Start: 2025-03-06

## 2025-04-29 ENCOUNTER — OFFICE VISIT (OUTPATIENT)
Age: 55
End: 2025-04-29
Payer: MEDICARE

## 2025-04-29 VITALS
HEIGHT: 72 IN | HEART RATE: 67 BPM | BODY MASS INDEX: 26.19 KG/M2 | TEMPERATURE: 98 F | DIASTOLIC BLOOD PRESSURE: 69 MMHG | RESPIRATION RATE: 16 BRPM | SYSTOLIC BLOOD PRESSURE: 109 MMHG | OXYGEN SATURATION: 98 % | WEIGHT: 193.4 LBS

## 2025-04-29 DIAGNOSIS — R19.8 CHANGE IN BOWEL MOVEMENT: Primary | ICD-10-CM

## 2025-04-29 DIAGNOSIS — K40.91 UNILATERAL RECURRENT INGUINAL HERNIA WITHOUT OBSTRUCTION OR GANGRENE: ICD-10-CM

## 2025-04-29 PROCEDURE — 99214 OFFICE O/P EST MOD 30 MIN: CPT | Performed by: SURGERY

## 2025-04-29 RX ORDER — PREGABALIN 150 MG/1
300 CAPSULE ORAL 2 TIMES DAILY
COMMUNITY

## 2025-04-29 ASSESSMENT — PATIENT HEALTH QUESTIONNAIRE - PHQ9
SUM OF ALL RESPONSES TO PHQ QUESTIONS 1-9: 0
SUM OF ALL RESPONSES TO PHQ QUESTIONS 1-9: 0
1. LITTLE INTEREST OR PLEASURE IN DOING THINGS: NOT AT ALL
2. FEELING DOWN, DEPRESSED OR HOPELESS: NOT AT ALL
SUM OF ALL RESPONSES TO PHQ QUESTIONS 1-9: 0
SUM OF ALL RESPONSES TO PHQ QUESTIONS 1-9: 0

## 2025-04-29 NOTE — PROGRESS NOTES
Identified pt with two pt identifiers (name and ). Reviewed chart in preparation for visit and have obtained necessary documentation.    Rico Padilla is a 54 y.o. male  Chief Complaint   Patient presents with    Hernia     inguinal     /69 (BP Site: Right Upper Arm, Patient Position: Sitting, BP Cuff Size: Large Adult)   Pulse 67   Temp 98 °F (36.7 °C) (Oral)   Resp 16   Ht 1.829 m (6')   Wt 87.7 kg (193 lb 6.4 oz)   SpO2 98%   BMI 26.23 kg/m²     1. Have you been to the ER, urgent care clinic since your last visit?  Hospitalized since your last visit?no    2. Have you seen or consulted any other health care providers outside of the [facility] since your last visit?  Include any pap smears or colon screening. no

## 2025-04-29 NOTE — PROGRESS NOTES
Surgery Progress Note    4/29/2025    had concerns including Hernia (Right inguinal. Patient states that he does not feel like his hernia is any better than prior to surgery.  He is c/o constipation and pain in his right groin area).     Subjective:     Patient is a 54 y.o. male who on 11/4/2024 I performed a robotic repair of a right inguinal hernia.  I found a direct inguinal hernia with a lipoma of the cord.  The patient never followed up postop.  He is here today stating that even since his surgery he still been having the same symptoms.  Reports when he gets up he has some tenderness in the right groin area, as well as some time after eating he does get some tenderness and discomfort in the right groin area.  The patient has never had a colonoscopy in the past either.  And he does describe that his bowel movements have changed.  He states he is currently taking stool softeners and drinking water as well.  Patient states that his stools have also gotten darker and have had decreased caliber.      Constitutional: No fever or chills  Neurologic: No headache  Eyes: No scleral icterus or irritated eyes  Nose: No nasal pain or drainage  Mouth: No oral lesions or sore throat  Cardiac: No palpations or chest pain  Pulmonary: No cough or shortness of breath  Gastrointestinal: No nausea, emesis, diarrhea, or constipation  Genitourinary: No dysuria  Musculoskeletal: No muscle or joint tenderness  Skin: No rashes or lesions  Psychiatric: No anxiety or depressed mood    Current Outpatient Medications   Medication Instructions    albuterol sulfate HFA (PROVENTIL;VENTOLIN;PROAIR) 108 (90 Base) MCG/ACT inhaler INHALE 1 PUFF BY MOUTH EVERY 4 HOURS AS NEEDED FOR WHEEZING    azithromycin (ZITHROMAX) 250 MG tablet TAKE 2 TABLETS BY MOUTH FOR 1 DAY THEN TAKE 1 TABLET BY MOUTH DAILY FOR 4 DAYS    baclofen (LIORESAL) 10 MG tablet TAKE 1 TABLET BY MOUTH TWICE DAILY AS NEEDED FOR SPASM    betamethasone dipropionate 0.05 % ointment

## 2025-08-06 SDOH — ECONOMIC STABILITY: INCOME INSECURITY: IN THE LAST 12 MONTHS, WAS THERE A TIME WHEN YOU WERE NOT ABLE TO PAY THE MORTGAGE OR RENT ON TIME?: PATIENT DECLINED

## 2025-08-06 SDOH — ECONOMIC STABILITY: FOOD INSECURITY: WITHIN THE PAST 12 MONTHS, THE FOOD YOU BOUGHT JUST DIDN'T LAST AND YOU DIDN'T HAVE MONEY TO GET MORE.: NEVER TRUE

## 2025-08-06 SDOH — ECONOMIC STABILITY: TRANSPORTATION INSECURITY
IN THE PAST 12 MONTHS, HAS THE LACK OF TRANSPORTATION KEPT YOU FROM MEDICAL APPOINTMENTS OR FROM GETTING MEDICATIONS?: PATIENT DECLINED

## 2025-08-06 SDOH — ECONOMIC STABILITY: FOOD INSECURITY: WITHIN THE PAST 12 MONTHS, YOU WORRIED THAT YOUR FOOD WOULD RUN OUT BEFORE YOU GOT MONEY TO BUY MORE.: NEVER TRUE

## 2025-08-06 SDOH — ECONOMIC STABILITY: TRANSPORTATION INSECURITY
IN THE PAST 12 MONTHS, HAS LACK OF TRANSPORTATION KEPT YOU FROM MEETINGS, WORK, OR FROM GETTING THINGS NEEDED FOR DAILY LIVING?: PATIENT DECLINED

## 2025-08-08 ENCOUNTER — TELEMEDICINE (OUTPATIENT)
Age: 55
End: 2025-08-08
Payer: MEDICARE

## 2025-08-08 DIAGNOSIS — G99.2 STENOSIS OF CERVICAL SPINE WITH MYELOPATHY (HCC): ICD-10-CM

## 2025-08-08 DIAGNOSIS — F41.1 GAD (GENERALIZED ANXIETY DISORDER): Primary | ICD-10-CM

## 2025-08-08 DIAGNOSIS — R53.83 OTHER FATIGUE: ICD-10-CM

## 2025-08-08 DIAGNOSIS — M48.02 STENOSIS OF CERVICAL SPINE WITH MYELOPATHY (HCC): ICD-10-CM

## 2025-08-08 PROCEDURE — 99214 OFFICE O/P EST MOD 30 MIN: CPT | Performed by: FAMILY MEDICINE

## (undated) DEVICE — TUBING INSUF 0.3UM FLTR W/ LUERLOCK CONN

## (undated) DEVICE — CLICKLINE SCISSORS INSERT: Brand: CLICKLINE

## (undated) DEVICE — SYSTEM SMK EVAC LAP TBNG FILTER HSNG BENT STYL PNK SEE CLR

## (undated) DEVICE — TRANSFER SET 3": Brand: MEDLINE INDUSTRIES, INC.

## (undated) DEVICE — ROBOTIC GENERAL-SFMC: Brand: MEDLINE INDUSTRIES, INC.

## (undated) DEVICE — SUTURE DEV SZ 2-0 WND CLSR ABSRB GS-22 VLOC COVIDIEN VLOCM2145

## (undated) DEVICE — SUTURE VICRYL + SZ 4-0 L27IN ABSRB UD PS-2 3/8 CIR REV CUT VCP426H

## (undated) DEVICE — GLOVE SURG SZ 6 THK91MIL LTX FREE SYN POLYISOPRENE ANTI

## (undated) DEVICE — SOLUTION IRRIG 1000ML STRL H2O USP PLAS POUR BTL

## (undated) DEVICE — TIP COVER ACCESSORY

## (undated) DEVICE — BLADELESS OBTURATOR: Brand: WECK VISTA

## (undated) DEVICE — LIQUIBAND RAPID ADHESIVE 36/CS 0.8ML: Brand: MEDLINE

## (undated) DEVICE — INSUFFLATION NEEDLE TO ESTABLISH PNEUMOPERITONEUM.: Brand: INSUFFLATION NEEDLE

## (undated) DEVICE — SEAL

## (undated) DEVICE — ARM DRAPE